# Patient Record
Sex: FEMALE | Race: WHITE | HISPANIC OR LATINO | Employment: UNEMPLOYED | URBAN - METROPOLITAN AREA
[De-identification: names, ages, dates, MRNs, and addresses within clinical notes are randomized per-mention and may not be internally consistent; named-entity substitution may affect disease eponyms.]

---

## 2018-01-12 ENCOUNTER — OFFICE VISIT (OUTPATIENT)
Dept: URGENT CARE | Facility: MEDICAL CENTER | Age: 48
End: 2018-01-12
Payer: COMMERCIAL

## 2018-01-12 PROCEDURE — S9088 SERVICES PROVIDED IN URGENT: HCPCS

## 2018-01-12 PROCEDURE — 99203 OFFICE O/P NEW LOW 30 MIN: CPT

## 2018-01-14 NOTE — PROGRESS NOTES
Assessment   1  Dermatitis (692 9) (L30 9)   2  Acute pain of left wrist (719 43) (M22 532)    Plan   Acute pain of left wrist    · Wrist splint; Status:Complete;   Done: [id number]   · *1 -  [facility] Co-Management  *  Status: Hold For - Scheduling     Requested for: 35NCT5779  Care Summary provided  : Yes  Dermatitis    · Medrol 4 MG Oral Tablet Therapy Pack (MethylPREDNISolone); Take as directed  Six pills today, 5 the next day, four the next day, three the next day,    two the next day, one the last day    Discussion/Summary   Discussion Summary:    Patient rash secondary to unknown etiology at this point  She will be treated with a course of steroids for the rash and for the left wrist pain, along with a left wrist splint  You can also try OTC antihistamine such as benadryl or claritin/allegra/zyrtec  Due to patient's family history of autoimmune diseases, she should follow up with the primary care physician and/or Rheumatology for further evaluation of Systemic Lupus, Rhuematoid Arthitis, etc    use the wrist splint as directed  Take medication as directed  if any distress at all, go to emergency room right away for immediate evaluation  Medication Side Effects Reviewed: Possible side effects of new medications were reviewed with the patient/guardian today  Understands and agrees with treatment plan: The treatment plan was reviewed with the patient/guardian  The patient/guardian understands and agrees with the treatment plan    Counseling Documentation With Imm: The patient was counseled regarding instructions for management  Follow Up Instructions: Follow Up with your Primary Care Provider in 3-5 days  If your symptoms worsen, go to the [place] [address] Emergency Department  Chief Complaint   1  Rash  Chief Complaint Free Text Note Form: Pt states 5 days ago started with rash on her arms (mostly R), legs, neck and upper chest  Denies new food or products  Mildly itchy  Took Benadryl with little relief  Also could not swallow, and Left ankle was swollen  Denies injury  The left ankle swelling went away the next day  2 days ago left knee pain and swollen  Yesterday afternoon Left wrist pain  This afternoon her left wrist became stiff and swollen and feels nauseous  History of Present Illness   HPI: Agree with nurse's note  Patient has previous history of carpal tunnel repair on the left wrist  She is unsure were the rash started, but states that is diffusely spread on her right arm, on her anterior/posterior chest, on her face, and on both legs bilaterally  Also small spots on the left arm   She denies any new medications, stopping any medications, new lotions, new detergents, in new clothing material  Patient states that she has multiple family members on her side, a sister and a niece both diagnosed with lupus  Her other sister has been diagnosed with hypothyroid  She has never been tested for any autoimmune disorders at this time  She states that this has never occurred before  She denies any fevers, chills, shortness of breath, difficulty breathing, chest pain, abdominal pain, nausea /vomiting  She states that she had a loose stool 2 days ago that she describes as diarrhea like, she denies any blood in the stool  The joint pain started along with rash, started in the left ankle, left knee then which have both resolved but now the Left wrist has felt very swollen and stiff since yesterday  She describes episodes of fatigued as well since the joint pain in the rash started  She denies any history of psoriasis or atopic dermatitis    Hospital Based Practices Required Assessment:      Abuse And Domestic Violence Screen   Domestic violence screen not done today  Reason DV Screen not done: family in room       Depression And Suicide Screen  Suicide screen not done today  -- Reason suicide screen not done: family in room  Prefered Language is  [place]        Primary Language is  [place]  Review of Systems   Focused-Female:      Constitutional: as noted in HPI       ENT: as noted in HPI  Cardiovascular: as noted in HPI  Respiratory: as noted in HPI  Gastrointestinal: as noted in HPI  ROS Reviewed:    ROS reviewed  Active Problems   1  Breast cancer screening (V76 10) (Z12 39)   2  Encounter for colorectal cancer screening (V76 51) (Z12 11,Z12 12)   3  Esophageal reflux (530 81) (K21 9)   4  Esophagitis (530 10) (K20 9)   5  Flu vaccine need (V04 81) (Z23)   6  Need for immunization against influenza (V04 81) (Z23)   7  Obesity (278 00) (E66 9)   8  Screening for malignant neoplasm of cervix (V76 2) (Z12 4)   9  Vitamin D deficiency (268 9) (E55 9)    Past Medical History   1  History of acute pharyngitis (V12 69) (Z87 09)   2  History of hypokalemia (V12 29) (Z86 39)   3  History of seborrheic keratosis (V13 3) (Z87 2)   4  History of upper respiratory infection (V12 09) (Z87 09)  Active Problems And Past Medical History Reviewed: The active problems and past medical history were reviewed and updated today  Family History   Mother    1  No pertinent family history  Sister    2  Family history of Colon cancer  Family History    3  Family history of Coronary Artery Disease (V17 49)   4  Family history of Diabetes Mellitus (V18 0)   5  Family history of Hypertension (V17 49)   6  Family history of Stroke Syndrome (V17 1)  Family History Reviewed: The family history was reviewed and updated today  Social History    · Denied: History of Alcohol Use (History)   · Never A Smoker   · Denied: History of Tobacco Use  Social History Reviewed: The social history was reviewed and updated today  The social history was reviewed and is unchanged  Surgical History   1  History of Esophagogastroduodenoscopy With Biopsy  Surgical History Reviewed: The surgical history was reviewed and updated today  Current Meds    1   Calcium 500/D 500-200 MG-UNIT Oral Tablet; Take 2 daily; Therapy: 68GGH0367 to (Last Rx:24Nov2015) Ordered   2  Multiple Vitamins Oral Tablet; Take 1 daily; Therapy: 34RYJ9379 to (Last Rx:24Nov2015) Ordered   3  Omeprazole 20 MG Oral Capsule Delayed Release; TAKE 1 CAPSULE DAILY; Therapy: 38KEK6558 to (Evaluate:07Spc8116); Last Rx:24Nov2015 Ordered  Medication List Reviewed: The medication list was reviewed and updated today  Allergies   1  No Known Drug Allergies    Vitals   Signs   Recorded: 12Jan2018 06:38PM   Temperature: 99 1 F  Heart Rate: 86  Respiration: 18  Systolic: 076  Diastolic: 82  Weight: 262 lb 2 oz  BMI Calculated: 46 69  BSA Calculated: 2 07  O2 Saturation: 98    Physical Exam        Constitutional      General appearance: No acute distress, well appearing and well nourished  Eyes      Conjunctiva and lids: No swelling, erythema or discharge  Pupils and irises: Equal, round and reactive to light  Ears, Nose, Mouth, and Throat      External inspection of ears and nose: Normal        Otoscopic examination: Tympanic membranes translucent with normal light reflex  Canals patent without erythema  Nasal mucosa, septum, and turbinates: Normal without edema or erythema  Oropharynx: Normal with no erythema, edema, exudate or lesions  Pulmonary      Respiratory effort: No increased work of breathing or signs of respiratory distress  Auscultation of lungs: Clear to auscultation  Cardiovascular      Auscultation of heart: Normal rate and rhythm, normal S1 and S2, without murmurs  Musculoskeletal      Gait and station: Normal        Inspection/palpation of joints, bones, and muscles: Abnormal  -- very tender to palpation of the left wrist diffusely  Active range of motion was not able to be evaluated due to intense pain experienced by the patient  no visible bruising /edema / erythema   No visible puncture wounds, bleeding from the left wrist       Skin Skin and subcutaneous tissue: Abnormal  -- Diffuse erythematous macular blanchable rash found on both upper extremities, chest, on the face, Nasolabial folds are not spared with this rash  Rash is not tender to touch  No visible vesicles, bullae, ulcerations, crusting  No active bleeding from any of the sites        Psychiatric      Orientation to person, place, and time: Normal        Signatures    Electronically signed by : JULIUS Sawant; Jan 12 2018  7:19PM EST                       (Author)     Electronically signed by : JOAQUIM Seaman ; Jan 13 2018 10:26AM EST

## 2018-01-18 ENCOUNTER — TRANSCRIBE ORDERS (OUTPATIENT)
Dept: ADMINISTRATIVE | Facility: HOSPITAL | Age: 48
End: 2018-01-18

## 2018-01-18 ENCOUNTER — ALLSCRIPTS OFFICE VISIT (OUTPATIENT)
Dept: OTHER | Facility: OTHER | Age: 48
End: 2018-01-18

## 2018-01-18 ENCOUNTER — APPOINTMENT (OUTPATIENT)
Dept: LAB | Facility: HOSPITAL | Age: 48
End: 2018-01-18
Payer: COMMERCIAL

## 2018-01-18 DIAGNOSIS — L30.9 DERMATITIS: ICD-10-CM

## 2018-01-18 DIAGNOSIS — M25.50 PAIN IN JOINT, MULTIPLE SITES: ICD-10-CM

## 2018-01-18 DIAGNOSIS — M25.532 PAIN IN LEFT WRIST: ICD-10-CM

## 2018-01-18 DIAGNOSIS — M25.50 PAIN IN JOINT, MULTIPLE SITES: Primary | ICD-10-CM

## 2018-01-18 DIAGNOSIS — M25.50 PAIN IN JOINT: ICD-10-CM

## 2018-01-18 LAB
ALBUMIN SERPL BCP-MCNC: 3.2 G/DL (ref 3.5–5)
ALP SERPL-CCNC: 70 U/L (ref 46–116)
ALT SERPL W P-5'-P-CCNC: 59 U/L (ref 12–78)
ANION GAP SERPL CALCULATED.3IONS-SCNC: 8 MMOL/L (ref 4–13)
AST SERPL W P-5'-P-CCNC: 21 U/L (ref 5–45)
BASOPHILS # BLD AUTO: 0.03 THOUSANDS/ΜL (ref 0–0.1)
BASOPHILS NFR BLD AUTO: 0 % (ref 0–1)
BILIRUB SERPL-MCNC: 0.16 MG/DL (ref 0.2–1)
BUN SERPL-MCNC: 18 MG/DL (ref 5–25)
CALCIUM SERPL-MCNC: 8.7 MG/DL (ref 8.3–10.1)
CHLORIDE SERPL-SCNC: 103 MMOL/L (ref 100–108)
CO2 SERPL-SCNC: 29 MMOL/L (ref 21–32)
CREAT SERPL-MCNC: 0.82 MG/DL (ref 0.6–1.3)
CRP SERPL QL: 5.8 MG/L
EOSINOPHIL # BLD AUTO: 0.15 THOUSAND/ΜL (ref 0–0.61)
EOSINOPHIL NFR BLD AUTO: 1 % (ref 0–6)
ERYTHROCYTE [DISTWIDTH] IN BLOOD BY AUTOMATED COUNT: 14.5 % (ref 11.6–15.1)
ERYTHROCYTE [SEDIMENTATION RATE] IN BLOOD: 18 MM/HOUR (ref 0–20)
GFR SERPL CREATININE-BSD FRML MDRD: 85 ML/MIN/1.73SQ M
GLUCOSE SERPL-MCNC: 96 MG/DL (ref 65–140)
HCT VFR BLD AUTO: 38.4 % (ref 34.8–46.1)
HGB BLD-MCNC: 12.3 G/DL (ref 11.5–15.4)
LYMPHOCYTES # BLD AUTO: 2.1 THOUSANDS/ΜL (ref 0.6–4.47)
LYMPHOCYTES NFR BLD AUTO: 16 % (ref 14–44)
MCH RBC QN AUTO: 28 PG (ref 26.8–34.3)
MCHC RBC AUTO-ENTMCNC: 32 G/DL (ref 31.4–37.4)
MCV RBC AUTO: 87 FL (ref 82–98)
MONOCYTES # BLD AUTO: 0.82 THOUSAND/ΜL (ref 0.17–1.22)
MONOCYTES NFR BLD AUTO: 6 % (ref 4–12)
NEUTROPHILS # BLD AUTO: 9.73 THOUSANDS/ΜL (ref 1.85–7.62)
NEUTS SEG NFR BLD AUTO: 77 % (ref 43–75)
NRBC BLD AUTO-RTO: 0 /100 WBCS
PLATELET # BLD AUTO: 310 THOUSANDS/UL (ref 149–390)
PMV BLD AUTO: 10 FL (ref 8.9–12.7)
POTASSIUM SERPL-SCNC: 4 MMOL/L (ref 3.5–5.3)
PROT SERPL-MCNC: 7.2 G/DL (ref 6.4–8.2)
RBC # BLD AUTO: 4.4 MILLION/UL (ref 3.81–5.12)
SODIUM SERPL-SCNC: 140 MMOL/L (ref 136–145)
TSH SERPL DL<=0.05 MIU/L-ACNC: 1.75 UIU/ML (ref 0.36–3.74)
WBC # BLD AUTO: 12.83 THOUSAND/UL (ref 4.31–10.16)

## 2018-01-18 PROCEDURE — 86430 RHEUMATOID FACTOR TEST QUAL: CPT

## 2018-01-18 PROCEDURE — 86618 LYME DISEASE ANTIBODY: CPT

## 2018-01-18 PROCEDURE — 86038 ANTINUCLEAR ANTIBODIES: CPT

## 2018-01-18 PROCEDURE — 85652 RBC SED RATE AUTOMATED: CPT

## 2018-01-18 PROCEDURE — 86140 C-REACTIVE PROTEIN: CPT

## 2018-01-18 PROCEDURE — 36415 COLL VENOUS BLD VENIPUNCTURE: CPT

## 2018-01-18 PROCEDURE — 84443 ASSAY THYROID STIM HORMONE: CPT

## 2018-01-18 PROCEDURE — 80053 COMPREHEN METABOLIC PANEL: CPT

## 2018-01-18 PROCEDURE — 85025 COMPLETE CBC W/AUTO DIFF WBC: CPT

## 2018-01-19 LAB
B BURGDOR IGG SER IA-ACNC: 0.14
B BURGDOR IGM SER IA-ACNC: 0.37
RHEUMATOID FACT SER QL LA: NEGATIVE
RYE IGE QN: NEGATIVE

## 2018-01-19 NOTE — PROGRESS NOTES
Assessment   1  Acute pain of left wrist (719 43) (M25 532)   2  Arthralgia (719 40) (M25 50)   3  Dermatitis (692 9) (L30 9)    Plan   Acute pain of left wrist    · *1 -  [facility]YSICAL THERAPY-[facility] Co-Management  *  Status: Active     Requested for: 91CIK7715  Care Summary provided  : Yes   · 1 - Vladimir SPENCER, Roberth Quiles  (Orthopedic Surgery) Co-Management  *  Status: Active     Requested for: 88SAM8752  Care Summary provided  : Yes  Arthralgia    · (1) SHAINA SCREEN W/REFLEX TO TITER/PATTERN; Status:Active; Requested    CDF:[id number];    · (1) CBC/PLT/DIFF; Status:Active; Requested AXJ:[id number];    · (1) COMPREHENSIVE METABOLIC PANEL; Status:Active; Requested NYN:[id number];    · (1) C-REACTIVE PROTEIN; Status:Active; Requested [id number];    · (1) RHEUMATOID FACTOR SCREEN; Status:Active; Requested SCU:[id number];    · (1) SED RATE; Status:Active; Requested for:2018;    · (1) TSH WITH FT4 REFLEX; Status:Active; Requested [id number]; Arthralgia, Dermatitis    · (1) LYME ANTIBODY PROFILE W/REFLEX TO WESTERN BLOT; Status:Active; Requested    K:[id number]; Discussion/Summary      Patient will be referred to a hand specialsit for her left wrist pain I will also refer her for physical therapy Patient to have albs done again for rheumatlogy disorder Discussed that there is a also viral illness as a possibility for her  Chief Complaint   follow up urgent care   rash and joint pain      History of Present Illness   Patient is here for marylin from urgent care She has started with pain in the left ankle with swelling about 2 weeks ago that went away on its own in 2-3 days and then bilateral knees started to have pain and swelling also Then her left shoulder and now the left wrist pain Patiet has rash diffusely on the arms and legs Patient was seen at [facility] and advised to have rheumatology workup Patient had labs done for autoimmune testing in  and was negative per our records She was placed on steroids and sent home Patient has history of left wrist dequervain tenosynovitis surgery       Brayden Alcocer presents with complaints of sudden onset of constant episodes of moderate neck, bilateral shoulder, left wrist, bilateral knee and left ankle arthralgias, described as aching, non-radiating  Episodes started about 2 weeks ago  Her symptoms are caused by no known event  Symptoms are improved by NSAIDs and rest  Symptoms are unchanged  Risk Factors: obesity and sedentary life style  Pertinent Medical History: no connective tissue disease, no rheumatoid arthritis, no osteoarthritis, no gout, no impaired immunity, no systemic lupus erythematosus, no sickle cell disease, no hemophilia, no diabetes, no inflammatory bowel disease, no chronic fatigue syndrome, no fibromyalgia, no hepatitis, no lyme disease, no malignancy, no HIV infection and no STD  Family History: connective tissue disease, but not osteoarthritis, rheumatoid arthritis, gout, inflammatory bowel disease, sickle cell disease, hemophilia and tuberculosis  Associated symptoms include swelling,-- stiffness-- and-- rash, but-- no redness,-- no joint warmth,-- no deformity,-- no decreased range of motion,-- no joint instability,-- no fever,-- no chills,-- no weight loss,-- no fatigue,-- no weakness,-- no eye irritation,-- no eye pain,-- no photosensitivity,-- no nausea,-- no vomiting,-- no anorexia,-- no abdominal discomfort,-- no constipation,-- no diarrhea,-- no shortness of breath,-- no chest pain-- and-- no cough  Review of Systems        Constitutional: as noted in HPI  Eyes: No complaints of eye pain, no red eyes, no eyesight problems, no discharge, no dry eyes, no itching of eyes  ENT: no complaints of earache, no loss of hearing, no nose bleeds, no nasal discharge, no sore throat, no hoarseness  Cardiovascular: no chest pain-- and-- no palpitations        Respiratory: No complaints of shortness of breath, no wheezing, no cough, no SOB on exertion, no orthopnea, no PND  Musculoskeletal: as noted in HPI  Integumentary: as noted in HPI  Active Problems   1  Acute pain of left wrist (719 43) (M25 532)   2  Breast cancer screening (V76 10) (Z12 39)   3  Dermatitis (692 9) (L30 9)   4  Encounter for colorectal cancer screening (V76 51) (Z12 11,Z12 12)   5  Esophageal reflux (530 81) (K21 9)   6  Flu vaccine need (V04 81) (Z23)   7  Need for immunization against influenza (V04 81) (Z23)   8  Obesity (278 00) (E66 9)   9  Screening for malignant neoplasm of cervix (V76 2) (Z12 4)   10  Vitamin D deficiency (268 9) (E55 9)    Past Medical History   1  History of acute pharyngitis (V12 69) (Z87 09)   2  History of esophagitis (V12 79) (Z87 19)   3  History of hypokalemia (V12 29) (Z86 39)   4  History of seborrheic keratosis (V13 3) (Z87 2)   5  History of upper respiratory infection (V12 09) (Z87 09)     The active problems and past medical history were reviewed and updated today  Surgical History   1  History of Esophagogastroduodenoscopy With Biopsy    Family History   Mother    1  No pertinent family history  Sister    2  Family history of Colon cancer  Family History    3  Family history of Coronary Artery Disease (V17 49)   4  Family history of Diabetes Mellitus (V18 0)   5  Family history of Hypertension (V17 49)   6  Family history of Stroke Syndrome (V17 1)     The family history was reviewed and updated today  Social History    · Denied: History of Alcohol Use (History)   · Never A Smoker   · Denied: History of Tobacco Use  The social history was reviewed and is unchanged  Current Meds    1  Calcium 500/D 500-200 MG-UNIT Oral Tablet; Take 2 daily; Therapy: 44RUG7607 to (Last Rx:24Nov2015) Ordered   2  Medrol 4 MG Oral Tablet Therapy Pack; Take as directed  Six pills today, 5 the next day,     four the next day, three the next day, two the next day, one the last day;      Therapy: 61UKK9862 to (Last Rx:12Jan2018)  Requested for: 12Jan2018 Ordered   3  Multiple Vitamins Oral Tablet; Take 1 daily; Therapy: 97QIH5290 to (Last Rx:24Nov2015) Ordered   4  Omeprazole 20 MG Oral Capsule Delayed Release; TAKE 1 CAPSULE DAILY; Therapy: 03FRF7094 to (Evaluate:05Yrw5578); Last Rx:24Nov2015 Ordered     The medication list was reviewed and updated today  Allergies   1  No Known Drug Allergies    Vitals   Vital Signs    Recorded: 76OTE2020 14:71DM   Systolic 065   Diastolic 82   Height 5 ft 1 in   Weight 251 lb 8 oz   BMI Calculated 47 52   BSA Calculated 2 08     Physical Exam        Constitutional      General appearance: No acute distress, well appearing and well nourished  Eyes      Conjunctiva and lids: No swelling, erythema or discharge  Pupils and irises: Equal, round and reactive to light  Ears, Nose, Mouth, and Throat      External inspection of ears and nose: Normal        Otoscopic examination: Tympanic membranes translucent with normal light reflex  Canals patent without erythema  Nasal mucosa, septum, and turbinates: Normal without edema or erythema  Oropharynx: Normal with no erythema, edema, exudate or lesions  Pulmonary      Respiratory effort: No increased work of breathing or signs of respiratory distress  Auscultation of lungs: Clear to auscultation  Cardiovascular      Auscultation of heart: Normal rate and rhythm, normal S1 and S2, without murmurs  Examination of extremities for edema and/or varicosities: Normal        Carotid pulses: Normal        Lymphatic      Palpation of lymph nodes in neck: No lymphadenopathy  Musculoskeletal      Gait and station: Normal        Digits and nails: Normal without clubbing or cyanosis  Inspection/palpation of joints, bones, and muscles: Abnormal  -- left wrist pain to palpation mediall with swellling and tenderness at the 1st MCP joint        Skin      Skin and subcutaneous tissue: Abnormal  -- macular rash on legs, chest and amrs that blanches Face is spared as are palms and soles  Neurologic      Cranial nerves: Cranial nerves 2-12 intact         Psychiatric      Orientation to person, place, and time: Normal        Mood and affect: Normal           Signatures    Electronically signed by : Angelica Purcell DO; Jan 18 2018 12:31PM EST                       (Author)

## 2018-01-21 ENCOUNTER — GENERIC CONVERSION - ENCOUNTER (OUTPATIENT)
Dept: OTHER | Facility: OTHER | Age: 48
End: 2018-01-21

## 2018-01-22 VITALS
WEIGHT: 251.5 LBS | DIASTOLIC BLOOD PRESSURE: 82 MMHG | HEIGHT: 61 IN | SYSTOLIC BLOOD PRESSURE: 120 MMHG | BODY MASS INDEX: 47.48 KG/M2

## 2018-01-23 VITALS
DIASTOLIC BLOOD PRESSURE: 82 MMHG | WEIGHT: 247.13 LBS | HEART RATE: 86 BPM | RESPIRATION RATE: 18 BRPM | BODY MASS INDEX: 46.69 KG/M2 | OXYGEN SATURATION: 98 % | SYSTOLIC BLOOD PRESSURE: 128 MMHG | TEMPERATURE: 99.1 F

## 2018-01-24 NOTE — RESULT NOTES
Verified Results  (1) SHAINA SCREEN W/REFLEX TO TITER/PATTERN [id number] 12:58PM Fabiana Smart     Test Name Result Flag Reference   SHAINA SCREEN   Negative  Negative

## 2019-08-05 ENCOUNTER — LAB REQUISITION (OUTPATIENT)
Dept: LAB | Facility: HOSPITAL | Age: 49
End: 2019-08-05
Payer: COMMERCIAL

## 2019-08-05 DIAGNOSIS — N92.0 EXCESSIVE AND FREQUENT MENSTRUATION WITH REGULAR CYCLE: ICD-10-CM

## 2019-08-05 PROCEDURE — 88305 TISSUE EXAM BY PATHOLOGIST: CPT | Performed by: PATHOLOGY

## 2019-08-08 ENCOUNTER — TRANSCRIBE ORDERS (OUTPATIENT)
Dept: ADMINISTRATIVE | Facility: HOSPITAL | Age: 49
End: 2019-08-08

## 2019-08-08 DIAGNOSIS — Z12.39 BREAST SCREENING, UNSPECIFIED: Primary | ICD-10-CM

## 2019-08-13 ENCOUNTER — HOSPITAL ENCOUNTER (OUTPATIENT)
Dept: RADIOLOGY | Age: 49
Discharge: HOME | End: 2019-08-13
Payer: COMMERCIAL

## 2019-08-13 VITALS — HEIGHT: 61 IN | BODY MASS INDEX: 48.9 KG/M2 | WEIGHT: 259 LBS

## 2019-08-13 DIAGNOSIS — Z12.39 BREAST SCREENING, UNSPECIFIED: ICD-10-CM

## 2019-08-13 PROCEDURE — 77067 SCR MAMMO BI INCL CAD: CPT

## 2019-08-13 PROCEDURE — 77063 BREAST TOMOSYNTHESIS BI: CPT

## 2019-08-27 ENCOUNTER — HOSPITAL ENCOUNTER (OUTPATIENT)
Dept: ULTRASOUND IMAGING | Facility: CLINIC | Age: 49
Discharge: HOME | End: 2019-08-27
Payer: COMMERCIAL

## 2019-08-27 ENCOUNTER — HOSPITAL ENCOUNTER (OUTPATIENT)
Dept: MAMMOGRAPHY | Facility: CLINIC | Age: 49
Discharge: HOME | End: 2019-08-27
Payer: COMMERCIAL

## 2019-08-27 DIAGNOSIS — R92.8 ABNORMAL MAMMOGRAM: ICD-10-CM

## 2019-08-27 PROCEDURE — 76642 ULTRASOUND BREAST LIMITED: CPT

## 2019-08-27 PROCEDURE — G0279 TOMOSYNTHESIS, MAMMO: HCPCS

## 2019-08-27 PROCEDURE — 77065 DX MAMMO INCL CAD UNI: CPT

## 2019-08-27 NOTE — PROGRESS NOTES
Met with patient and Dr Ly Rodas   regarding recommendation for;      _____ RIGHT __x____LEFT      __x___Ultrasound guided  ______Stereotactic  Breast biopsy  ___x__Verbalized understanding  Blood thinners:  _____yes _x____no    Date stopped: ___x________    Biopsy teaching sheet given:  __x_____yes ______no    Alexda Pastel states she has history of uterine fibroids and pCOS

## 2019-08-29 ENCOUNTER — HOSPITAL ENCOUNTER (OUTPATIENT)
Dept: MAMMOGRAPHY | Facility: CLINIC | Age: 49
Discharge: HOME | End: 2019-08-29
Payer: COMMERCIAL

## 2019-08-29 ENCOUNTER — HOSPITAL ENCOUNTER (OUTPATIENT)
Dept: ULTRASOUND IMAGING | Facility: CLINIC | Age: 49
Discharge: HOME | End: 2019-08-29
Payer: COMMERCIAL

## 2019-08-29 VITALS
BODY MASS INDEX: 48.9 KG/M2 | DIASTOLIC BLOOD PRESSURE: 80 MMHG | WEIGHT: 259 LBS | SYSTOLIC BLOOD PRESSURE: 140 MMHG | HEART RATE: 76 BPM | HEIGHT: 61 IN

## 2019-08-29 DIAGNOSIS — R92.8 ABNORMAL ULTRASOUND OF BREAST: ICD-10-CM

## 2019-08-29 DIAGNOSIS — Z98.890 STATUS POST BREAST BIOPSY: ICD-10-CM

## 2019-08-29 PROCEDURE — 10160 PNXR ASPIR ABSC HMTMA BULLA: CPT

## 2019-08-29 PROCEDURE — 76942 ECHO GUIDE FOR BIOPSY: CPT

## 2019-08-29 RX ORDER — LIDOCAINE HYDROCHLORIDE 10 MG/ML
4 INJECTION, SOLUTION INFILTRATION; PERINEURAL ONCE
Status: COMPLETED | OUTPATIENT
Start: 2019-08-29 | End: 2019-08-29

## 2019-08-29 RX ADMIN — LIDOCAINE HYDROCHLORIDE 4 ML: 10 INJECTION, SOLUTION INFILTRATION; PERINEURAL at 11:40

## 2019-08-29 NOTE — PROGRESS NOTES
Procedure type:    __x___ultrasound guided cyst aspiration _____stereotactic    Breast:    __x___Left _____Right    Location: 3:00 7cm from nipple    Needle: 18g BD    # of passes: 1-scant white/yellow turbid fluid-discarded per Dr Reece Resides: Ultraclip-wing    Performed by: Dr Isis Alexander held for 5 minutes by: Viola Koch Strips:    __x___yes _____no    Band aid:    __x___yes_____no    Tape and guaze:    _____yes __x___no    Tolerated procedure:    __x___yes _____no

## 2019-08-29 NOTE — DISCHARGE INSTR - OTHER ORDERS
POST LARGE CORE BREAST BIOPSY PATIENT INFORMATION      1  Place an ice pack inside your bra over the top of the dressing every hour for 20 minutes (20 minutes on, 60 minutes off)  Do this until bedtime  2  Do not shower or bathe until the following morning  3  You may bathe your breast carefully with the steri-strips in place  Be careful    Not to loosen them  The steri-strips will fall off in 3-5 days  4  You may have mild discomfort, and you may have some bruising where the   Needle entered the skin  This should clear within 5-7 days  5  If you need medicine for discomfort, take acetaminophen products such as   Tylenol  You may also take Advil or Motrin products  6  Do not participate in strenuous activities such as-tennis, aerobics, skiing,  Weight lifting, etc  for 24 hours  Refrain from swimming/soaking for 72 hours  7  Wearing a bra for sleeping may be more comfortable for the first 24-48 hours  8  Watch for continued bleeding, pain or fever over 101; please call with any questions or concerns  [facility] call:  Bree Crook RN at [phone]  Suzan Lin RN at [phone]                    *After 4 PM call the Interventional Radiology Department                    [phone] and ask to speak with the nurse on call  For procedures done at the [address] [place] [address] call:         Di Reynolds RN at   *After 4 PM call the Interventional Radiology Department   [phone] and ask to speak with the nurse on call  For procedures done at [address] [place] [address] call: The Radiology Nurse at [phone]  *After 4 PM call your physician, or go to the Emergency Department  9          The final results of your biopsy are usually available within one week

## 2019-08-30 NOTE — PROGRESS NOTES
Post procedure call completed on 8/30/19 @ 1136    Bleeding: _____yes __x___no    Pain: _____yes __x____no    Redness/Swelling: ______yes ___x___no    Band aid removed: ___x__yes _____no    Steri-Strips intact: __x____yes _____no

## 2020-01-15 ENCOUNTER — OFFICE VISIT (OUTPATIENT)
Dept: OBGYN CLINIC | Facility: MEDICAL CENTER | Age: 50
End: 2020-01-15

## 2020-01-15 VITALS — WEIGHT: 259 LBS | BODY MASS INDEX: 48.9 KG/M2 | HEIGHT: 61 IN

## 2020-01-15 DIAGNOSIS — G56.32 RADIAL NEURITIS, LEFT: Primary | ICD-10-CM

## 2020-01-15 DIAGNOSIS — M18.11 ARTHRITIS OF CARPOMETACARPAL (CMC) JOINT OF RIGHT THUMB: ICD-10-CM

## 2020-01-15 PROCEDURE — 99204 OFFICE O/P NEW MOD 45 MIN: CPT | Performed by: ORTHOPAEDIC SURGERY

## 2020-01-15 RX ORDER — GABAPENTIN 100 MG/1
100 CAPSULE ORAL
Qty: 30 CAPSULE | Refills: 0 | Status: SHIPPED | OUTPATIENT
Start: 2020-01-15 | End: 2020-10-02

## 2020-01-15 NOTE — PROGRESS NOTES
Chief Complaint     Left wrist pain      History of Present Illness     Majo Chapman is a 47 y o  female left much greater than right wrist pain   She is right-hand dominant  She notes more than 20 years of left wrist pain without any obvious injury or trauma  This is localized initially over the radial aspect but has now extended throughout the whole wrist   She underwent release de Quervain tendonitis about 10 years ago without prior injection  This did not relieve her symptoms  She had a 2nd surgery which was exploratory, revealing a torn ligament that was repaired  After surgery from this could tendon repair, she was only placed into a splint for a few weeks and was taken out once her stitches were removed  That also did not relieve her pain  She has had what seems to be multiple wrist joint injections as well as physical therapy  She has had a few episodes of severe pain and inability to move her wrist   She describes constant burning and hypersensitivity  She cannot have her shirt sleeve over this area  She also mentions her thumb feeling stiff with occasional clicking and popping  She has also had EMG studies in the past which were within normal limits  Of note, she has had distal radial forearm pain that has radiated into the dorsal radial aspect of her hand throughout this entire process  The surgeries that she has had have not made her better from her initial symptomatology, but she has also had worsening symptoms into the dorsal thumb and hand  She is exquisitely hypersensitive to touch whenever she touches objects on the dorsum of her hand      History reviewed  No pertinent past medical history  Past Surgical History:   Procedure Laterality Date    DE QUERVAIN'S RELEASE      GALLBLADDER SURGERY      US BREAST CYST ASPIRATION LEFT INITIAL Left 8/29/2019       No Known Allergies    No current outpatient medications on file prior to visit       No current facility-administered medications on file prior to visit  Social History     Tobacco Use    Smoking status: Never Smoker    Smokeless tobacco: Never Used   Substance Use Topics    Alcohol use: Not Currently    Drug use: Never       Family History   Problem Relation Age of Onset    No Known Problems Mother     No Known Problems Father     No Known Problems Sister     No Known Problems Daughter     No Known Problems Maternal Grandmother     No Known Problems Maternal Grandfather     No Known Problems Paternal Grandmother     No Known Problems Paternal Grandfather     No Known Problems Sister     Breast cancer Half-Sister 52    Colon cancer Half-Sister 62    Breast cancer additional onset Half-Sister         total 4xs breast cancer    Endometrial cancer Half-Sister 47    Kidney failure Half-Sister     No Known Problems Maternal Aunt     No Known Problems Maternal Aunt     No Known Problems Paternal Aunt     No Known Problems Paternal Aunt        Review of Systems     As stated in the HPI  All other systems were reviewed and are negative  Physical Exam     Ht 5' 1" (1 549 m)   Wt 117 kg (259 lb)   BMI 48 94 kg/m²     GENERAL: This is a well-developed, well-nourished, age-appropriate patient in no acute distress  The patient is alert and oriented x3  Pleasant and cooperative  Eyes: Anicteric sclerae  Extraocular movements appear intact  HENT: Nares are patent with no drainage  Lungs: There is equal chest rise on inspection  Breathing is non-labored with no audible wheezing  Cardiovascular: No cyanosis  No upper extremity lymphadema  Skin: Skin is warm to touch  No obvious skin lesions or rashes other than described below  Neurologic: No ataxia  Psychiatric: Mood and affect are appropriate  On today's exam of the left wrist complaint was no swelling, ecchymosis, redness or signs infection  The skin is warm, dry and well perfused  She was mildly tender to palpation over the 1st dorsal compartment  Significant tenderness to palpation at the thumb CMC joint  Mildly positive Eichhoff and Finkelstein tests  There is no palpable nodule at the A1 pulley or edwin triggering on today's exam   She is able make a full composite fist oppose the thumb to the small finger  She had equal the pain-free wrist range of motion in all planes bilaterally  Sensation was intact to light touch with good capillary refill in all fingers the slightly decreased in the radial sensory nerve  Positive Tinel's at the radial sensory nerve at the Wartenberg interval   With elbow extension wrist ulnar deviation and forearm pronation, she has any worsening of her neuropathic type symptoms  Distal radial pulses intact  There is no antecubital adenopathy or cellulitis noted  Data Review     Results Reviewed     None             Imaging:  None today    Assessment and Plan      Diagnoses and all orders for this visit:    Radial neuritis, left  -     diclofenac sodium (VOLTAREN) 1 %; Apply 2 g topically 4 (four) times a day  -     gabapentin (NEURONTIN) 100 mg capsule; Take 1 capsule (100 mg total) by mouth daily at bedtime    Arthritis of carpometacarpal (CMC) joint of right thumb  -     Thumb Cude comf/Cool           58-year-old female with what appears to be a radial sensory neuritis as well as thumb CMC arthritis  I discussed the radial sensory neuritis may have been pre-existing to her surgeries, but also that during de Quervain release, there are branches that are commonly encountered and protected, though these could have been injured potentially  This would make sense considering her symptomatology did worsen after her 1st surgery  I did discuss with her that conservative management is the mainstay for these types of injuries and that nerve gliding exercises as well as gabapentin would be the nonsurgical approaches that I would prefer at this point    We did discuss that external neurolysis and neuroma cutting and burying as needed if encountered would be the surgical approach  We would likely get an ultrasound prior to any surgical intervention to assess if there is neuroma on the nerve  She is in good understanding of this and we will proceed with nonsurgical management of this condition at this point  I have discussed the natural history of thumb carpometacarpal arthritis as well as the treatment options  We discussed that arthritis in the thumb can be treated similarly to how arthritis is treated in many parts of the body with conservative management and then surgical intervention if the nonsurgical options do not succeeded bringing sustained pain relief and good function  The nonsurgical options include oral anti-inflammatory medication, braces, hand therapy, and injectable medications such as corticosteroid  These can all be trialed and repeated multiple times  We have also discussed the surgical management of thumb carpometacarpal arthritis which include thumb carpometacarpal joint fusion or arthroplasty using LRTI or suspensionplasty techniques  We have provider with a Comfort Cool brace as well for her thumb CMC arthritis as well as Voltaren gel    She will follow up in 1 month      Follow Up:  1 month    To Do Next Visit:     PROCEDURES PERFORMED:  Procedures  No Procedures performed today

## 2020-08-14 ENCOUNTER — HOSPITAL ENCOUNTER (OUTPATIENT)
Dept: RADIOLOGY | Age: 50
Discharge: HOME | End: 2020-08-14
Payer: COMMERCIAL

## 2020-08-14 VITALS — WEIGHT: 270 LBS | HEIGHT: 60 IN | BODY MASS INDEX: 53.01 KG/M2

## 2020-08-14 DIAGNOSIS — Z12.31 ENCOUNTER FOR SCREENING MAMMOGRAM FOR MALIGNANT NEOPLASM OF BREAST: ICD-10-CM

## 2020-08-14 PROCEDURE — 77063 BREAST TOMOSYNTHESIS BI: CPT

## 2020-08-14 PROCEDURE — 77067 SCR MAMMO BI INCL CAD: CPT

## 2020-10-02 ENCOUNTER — OFFICE VISIT (OUTPATIENT)
Dept: FAMILY MEDICINE CLINIC | Facility: CLINIC | Age: 50
End: 2020-10-02
Payer: COMMERCIAL

## 2020-10-02 VITALS
WEIGHT: 262 LBS | SYSTOLIC BLOOD PRESSURE: 130 MMHG | BODY MASS INDEX: 51.44 KG/M2 | DIASTOLIC BLOOD PRESSURE: 90 MMHG | TEMPERATURE: 97.2 F | HEIGHT: 60 IN

## 2020-10-02 DIAGNOSIS — Z00.00 ANNUAL PHYSICAL EXAM: Primary | ICD-10-CM

## 2020-10-02 DIAGNOSIS — Z23 ENCOUNTER FOR IMMUNIZATION: ICD-10-CM

## 2020-10-02 DIAGNOSIS — Z83.49 FAMILY HISTORY OF HYPOTHYROIDISM: ICD-10-CM

## 2020-10-02 DIAGNOSIS — Z12.12 SCREENING FOR COLORECTAL CANCER: ICD-10-CM

## 2020-10-02 DIAGNOSIS — E55.9 VITAMIN D DEFICIENCY: ICD-10-CM

## 2020-10-02 DIAGNOSIS — Z12.11 SCREENING FOR COLORECTAL CANCER: ICD-10-CM

## 2020-10-02 DIAGNOSIS — E66.01 MORBID OBESITY WITH BODY MASS INDEX (BMI) GREATER THAN OR EQUAL TO 50 (HCC): ICD-10-CM

## 2020-10-02 DIAGNOSIS — Z12.4 SCREENING FOR CERVICAL CANCER: ICD-10-CM

## 2020-10-02 PROCEDURE — 90471 IMMUNIZATION ADMIN: CPT | Performed by: FAMILY MEDICINE

## 2020-10-02 PROCEDURE — 90715 TDAP VACCINE 7 YRS/> IM: CPT | Performed by: FAMILY MEDICINE

## 2020-10-02 PROCEDURE — 99396 PREV VISIT EST AGE 40-64: CPT | Performed by: FAMILY MEDICINE

## 2020-10-02 PROCEDURE — 1036F TOBACCO NON-USER: CPT | Performed by: FAMILY MEDICINE

## 2020-10-02 PROCEDURE — 3725F SCREEN DEPRESSION PERFORMED: CPT | Performed by: FAMILY MEDICINE

## 2021-08-19 ENCOUNTER — HOSPITAL ENCOUNTER (OUTPATIENT)
Dept: RADIOLOGY | Age: 51
Discharge: HOME | End: 2021-08-19
Payer: COMMERCIAL

## 2021-08-19 VITALS — BODY MASS INDEX: 53.01 KG/M2 | HEIGHT: 60 IN | WEIGHT: 270 LBS

## 2021-08-19 DIAGNOSIS — Z12.31 ENCOUNTER FOR SCREENING MAMMOGRAM FOR MALIGNANT NEOPLASM OF BREAST: ICD-10-CM

## 2021-08-19 PROCEDURE — 77067 SCR MAMMO BI INCL CAD: CPT

## 2021-08-19 PROCEDURE — 77063 BREAST TOMOSYNTHESIS BI: CPT

## 2021-08-25 ENCOUNTER — HOSPITAL ENCOUNTER (OUTPATIENT)
Dept: RADIOLOGY | Age: 51
Discharge: HOME | End: 2021-08-25

## 2021-08-25 VITALS — WEIGHT: 270 LBS | HEIGHT: 60 IN | BODY MASS INDEX: 53.01 KG/M2

## 2021-08-25 DIAGNOSIS — Z12.31 ENCOUNTER FOR SCREENING MAMMOGRAM FOR MALIGNANT NEOPLASM OF BREAST: ICD-10-CM

## 2021-08-25 DIAGNOSIS — Z12.31 SCREENING MAMMOGRAM, ENCOUNTER FOR: ICD-10-CM

## 2022-04-11 ENCOUNTER — OFFICE VISIT (OUTPATIENT)
Dept: FAMILY MEDICINE CLINIC | Facility: CLINIC | Age: 52
End: 2022-04-11

## 2022-04-11 VITALS
WEIGHT: 266.2 LBS | SYSTOLIC BLOOD PRESSURE: 128 MMHG | BODY MASS INDEX: 52.26 KG/M2 | DIASTOLIC BLOOD PRESSURE: 84 MMHG | HEIGHT: 60 IN

## 2022-04-11 DIAGNOSIS — E66.01 MORBID OBESITY WITH BODY MASS INDEX (BMI) GREATER THAN OR EQUAL TO 50 (HCC): ICD-10-CM

## 2022-04-11 DIAGNOSIS — Z11.1 SCREENING FOR TUBERCULOSIS: ICD-10-CM

## 2022-04-11 DIAGNOSIS — Z12.4 SCREENING FOR CERVICAL CANCER: ICD-10-CM

## 2022-04-11 DIAGNOSIS — Z11.4 SCREENING FOR HIV (HUMAN IMMUNODEFICIENCY VIRUS): ICD-10-CM

## 2022-04-11 DIAGNOSIS — Z59.89 UNINSURED: ICD-10-CM

## 2022-04-11 DIAGNOSIS — Z00.00 ANNUAL PHYSICAL EXAM: Primary | ICD-10-CM

## 2022-04-11 DIAGNOSIS — Z13.220 SCREENING, LIPID: ICD-10-CM

## 2022-04-11 DIAGNOSIS — Z11.59 NEED FOR HEPATITIS C SCREENING TEST: ICD-10-CM

## 2022-04-11 DIAGNOSIS — N92.4 EXCESSIVE BLEEDING IN PREMENOPAUSAL PERIOD: ICD-10-CM

## 2022-04-11 DIAGNOSIS — Z13.1 SCREENING FOR DIABETES MELLITUS: ICD-10-CM

## 2022-04-11 PROBLEM — Z59.71 UNINSURED: Status: ACTIVE | Noted: 2022-04-11

## 2022-04-11 PROCEDURE — 99396 PREV VISIT EST AGE 40-64: CPT | Performed by: FAMILY MEDICINE

## 2022-04-11 SDOH — ECONOMIC STABILITY - INCOME SECURITY: OTHER PROBLEMS RELATED TO HOUSING AND ECONOMIC CIRCUMSTANCES: Z59.89

## 2022-04-11 NOTE — PROGRESS NOTES
[address] [address] [facility]    NAME: Isaac Butcher  AGE: 45 y o   SEX: female  : 1970     DATE: 2022     Assessment and Plan:     Problem List Items Addressed This Visit        Other    Morbid obesity with body mass index (BMI) greater than or equal to 50 (HCC)     Discussed diet and exercise with patient We also discussed screening for diabetes and lipids I also discussed covid vaccination with patient          Relevant Orders    Lipid panel    Comprehensive metabolic panel    TSH, 3rd generation    Ambulatory Referral to Social Work Care Management Program    Annual physical exam - Primary     Recommended diet and exercise as well as yearly flu shot covid vaccine  Patient also needs to see Gyn due to her excessive bleeding and alos needs colon cancer screening She is refusing these things due to lack of insurance         Excessive bleeding in premenopausal period     Patient has history of PCOS Patient is also having spotting for months She has had not seen GYN in some time no insurance          Relevant Orders    Ambulatory Referral to Obstetrics / Gynecology    Ambulatory Referral to Social Work Care Management Program    Uninsured     Contacted  and also suggested [facility] for her to get the GYN care she needs and also colon cancer screening          Relevant Orders    Ambulatory Referral to Social Work Care Management Program      Other Visit Diagnoses     Screening for diabetes mellitus        Relevant Orders    Comprehensive metabolic panel    Screening, lipid        Relevant Orders    Lipid panel    Screening for cervical cancer        Relevant Orders    Ambulatory Referral to Obstetrics / Gynecology    Screening for HIV (human immunodeficiency virus)        Relevant Orders    HIV 1/2 Antigen/Antibody (4th Generation) w Reflex [facility]    Need for hepatitis C screening test        Relevant Orders Hepatitis C antibody    Screening for tuberculosis        Relevant Orders    TB Skin Test          Immunizations and preventive care screenings were discussed with patient today  Appropriate education was printed on patient's after visit summary  Counseling:  Alcohol/drug use: discussed moderation in alcohol intake, the recommendations for healthy alcohol use, and avoidance of illicit drug use  Dental Health: discussed importance of regular tooth brushing, flossing, and dental visits  Injury prevention: discussed safety/seat belts, safety helmets, smoke detectors, carbon dioxide detectors, and smoking near bedding or upholstery  Sexual health: discussed sexually transmitted diseases, partner selection, use of condoms, avoidance of unintended pregnancy, and contraceptive alternatives  · Exercise: the importance of regular exercise/physical activity was discussed  Recommend exercise 3-5 times per week for at least 30 minutes  No follow-ups on file  Chief Complaint:     Chief Complaint   Patient presents with    Annual Exam      History of Present Illness:     Adult Annual Physical   Patient here for a comprehensive physical exam  The patient reports taht she has isseus with uterine spotting and bleeding between cycles    Diet and Physical Activity  · Diet/Nutrition: limited junk food and low carb diet  · Exercise: no formal exercise  Depression Screening  PHQ-2/9 Depression Screening    Little interest or pleasure in doing things: 0 - not at all  Feeling down, depressed, or hopeless: 0 - not at all  PHQ-2 Score: 0  PHQ-2 Interpretation: Negative depression screen       General Health  · Sleep: sleeps well and gets 4-6 hours of sleep on average  · Hearing: normal - bilateral   · Vision: most recent eye exam >1 year ago and wears glasses  · Dental: no dental visits for >1 year and brushes teeth twice daily         /GYN Health  · Patient is: perimenopausal  · Last menstrual period: has called them  · Contraceptive method: none  Review of Systems:     Review of Systems   Constitutional: Negative for fatigue, fever and unexpected weight change  HENT: Negative for congestion, sinus pain and trouble swallowing  Eyes: Negative for discharge and visual disturbance  Respiratory: Negative for cough, chest tightness, shortness of breath and wheezing  Cardiovascular: Negative for chest pain, palpitations and leg swelling  Gastrointestinal: Negative for abdominal pain, blood in stool, constipation, diarrhea, nausea and vomiting  Genitourinary: Negative for difficulty urinating, dysuria, frequency and hematuria  Musculoskeletal: Negative for arthralgias, gait problem and joint swelling  Skin: Negative for rash and wound  Allergic/Immunologic: Negative for environmental allergies and food allergies  Neurological: Negative for dizziness, syncope, weakness, numbness and headaches  Hematological: Negative for adenopathy  Does not bruise/bleed easily  Psychiatric/Behavioral: Negative for confusion, decreased concentration and sleep disturbance  The patient is not nervous/anxious         Past Medical History:     Past Medical History:   Diagnosis Date    Obesity       Past Surgical History:     Past Surgical History:   Procedure Laterality Date    BREAST BIOPSY Left 08/29/2019    benign    BREAST CYST ASPIRATION Left 08/29/2019    benign    DE QUERVAIN'S RELEASE      GALLBLADDER SURGERY      US BREAST CYST ASPIRATION LEFT INITIAL Left 8/29/2019      Social History:     Social History     Socioeconomic History    Marital status: /Civil Union     Spouse name: None    Number of children: None    Years of education: None    Highest education level: None   Occupational History    None   Tobacco Use    Smoking status: Never Smoker    Smokeless tobacco: Never Used   Vaping Use    Vaping Use: Never used   Substance and Sexual Activity    Alcohol use: Not Currently    Drug use: Never    Sexual activity: Yes     Partners: Male     Birth control/protection: None   Other Topics Concern    None   Social History Narrative    None     Social Determinants of Health     Financial Resource Strain: Not on file   Food Insecurity: Not on file   Transportation Needs: Not on file   Physical Activity: Not on file   Stress: Not on file   Social Connections: Not on file   Intimate Partner Violence: Not on file   Housing Stability: Not on file      Family History:     Family History   Problem Relation Age of Onset    Diabetes Mother     Kidney failure Mother     Diabetes Father     No Known Problems Sister     No Known Problems Daughter     No Known Problems Maternal Grandmother     No Known Problems Maternal Grandfather     No Known Problems Paternal Grandmother     No Known Problems Paternal Grandfather     Hypertension Sister     Hypothyroidism Sister     Breast cancer Half-Sister 52    Colon cancer Half-Sister 62    Breast cancer additional onset Half-Sister         total 4xs breast cancer    Endometrial cancer Half-Sister 47    Kidney failure Half-Sister     No Known Problems Maternal Aunt     No Known Problems Maternal Aunt     No Known Problems Paternal Aunt     No Known Problems Paternal Aunt     Substance Abuse Half-Brother     No Known Problems Half-Brother       Current Medications:     No current outpatient medications on file  No current facility-administered medications for this visit  Allergies:     No Known Allergies   Physical Exam:     /84   Ht 5' (1 524 m)   Wt 121 kg (266 lb 3 2 oz)   BMI 51 99 kg/m²     Physical Exam  Vitals and nursing note reviewed  Constitutional:       Appearance: She is well-developed  She is obese  HENT:      Head: Normocephalic and atraumatic  Right Ear: External ear normal       Left Ear: External ear normal       Nose: Nose normal       Mouth/Throat:      Pharynx: No oropharyngeal exudate     Eyes: Extraocular Movements: Extraocular movements intact  Conjunctiva/sclera: Conjunctivae normal       Pupils: Pupils are equal, round, and reactive to light  Neck:      Thyroid: No thyromegaly  Trachea: No tracheal deviation  Cardiovascular:      Rate and Rhythm: Normal rate and regular rhythm  Heart sounds: Normal heart sounds  Pulmonary:      Effort: Pulmonary effort is normal  No respiratory distress  Breath sounds: Normal breath sounds  No wheezing or rales  Abdominal:      General: Bowel sounds are normal       Palpations: Abdomen is soft  Musculoskeletal:         General: Normal range of motion  Cervical back: Normal range of motion and neck supple  Lymphadenopathy:      Cervical: No cervical adenopathy  Skin:     General: Skin is warm  Findings: No rash  Neurological:      General: No focal deficit present  Mental Status: She is alert and oriented to person, place, and time  Cranial Nerves: No cranial nerve deficit  Motor: No abnormal muscle tone  Psychiatric:         Mood and Affect: Mood normal          Behavior: Behavior normal          Thought Content: Thought content normal          Judgment: Judgment normal           Krystal Bardales DO  [facility] BMI Counseling: Body mass index is 51 99 kg/m²  The BMI is above normal  Nutrition recommendations include reducing portion sizes, decreasing overall calorie intake, 3-5 servings of fruits/vegetables daily, moderation in carbohydrate intake and increasing intake of lean protein  Exercise recommendations include exercising 3-5 times per week

## 2022-04-11 NOTE — PROGRESS NOTES
Assessment/Plan:    No problem-specific Assessment & Plan notes found for this encounter  There are no diagnoses linked to this encounter  Subjective:   Chief Complaint   Patient presents with    Annual Exam          Patient ID: Bala Landrum is a 45 y o  female      HPI    The following portions of the patient's history were reviewed and updated as appropriate: allergies, current medications, past family history, past medical history, past social history, past surgical history and problem list     Review of Systems      Objective:      /84   Ht 5' (1 524 m)   Wt 121 kg (266 lb 3 2 oz)   BMI 51 99 kg/m²          Physical Exam

## 2022-04-11 NOTE — ASSESSMENT & PLAN NOTE
Patient has history of PCOS Patient is also having spotting for months She has had not seen GYN in some time no insurance

## 2022-04-11 NOTE — PATIENT INSTRUCTIONS
Wellness Visit for Adults   AMBULATORY CARE:   A wellness visit  is when you see your healthcare provider to get screened for health problems  Your healthcare provider will also give you advice on how to stay healthy  Write down your questions so you remember to ask them  Ask your healthcare provider how often you should have a wellness visit  What happens at a wellness visit:  Your healthcare provider will ask about your health, and your family history of health problems  This includes high blood pressure, heart disease, and cancer  He or she will ask if you have symptoms that concern you, if you smoke, and about your mood  You may also be asked about your intake of medicines, supplements, food, and alcohol  Any of the following may be done:  · Your weight  will be checked  Your height may also be checked so your body mass index (BMI) can be calculated  Your BMI shows if you are at a healthy weight  · Your blood pressure  and heart rate will be checked  Your temperature may also be checked  · Blood and urine tests  may be done  Blood tests may be done to check your cholesterol levels  Abnormal cholesterol levels increase your risk for heart disease and stroke  You may also need a blood or urine test to check for diabetes if you are at increased risk  Urine tests may be done to look for signs of an infection or kidney disease  · A physical exam  includes checking your heartbeat and lungs with a stethoscope  Your healthcare provider may also check your skin to look for sun damage  · Screening tests  may be recommended  A screening test is done to check for diseases that may not cause symptoms  The screening tests you may need depend on your age, gender, family history, and lifestyle habits  For example, colorectal screening may be recommended if you are 48years old or older  Screening tests you need if you are a woman:   · A Pap smear  is used to screen for cervical cancer   Pap smears are usually done every 3 to 5 years depending on your age  You may need them more often if you have had abnormal Pap smear test results in the past  Ask your healthcare provider how often you should have a Pap smear  · A mammogram  is an x-ray of your breasts to screen for breast cancer  Experts recommend mammograms every 2 years starting at age 48 years  You may need a mammogram at age 52 years or younger if you have an increased risk for breast cancer  Talk to your healthcare provider about when you should start having mammograms and how often you need them  Vaccines you may need:   · Get an influenza vaccine  every year  The influenza vaccine protects you from the flu  Several types of viruses cause the flu  The viruses change over time, so new vaccines are made each year  · Get a tetanus-diphtheria (Td) booster vaccine  every 10 years  This vaccine protects you against tetanus and diphtheria  Tetanus is a severe infection that may cause painful muscle spasms and lockjaw  Diphtheria is a severe bacterial infection that causes a thick covering in the back of your mouth and throat  · Get a human papillomavirus (HPV) vaccine  if you are female and aged 23 to 32 or male 23 to 24 and never received it  This vaccine protects you from HPV infection  HPV is the most common infection spread by sexual contact  HPV may also cause vaginal, penile, and anal cancers  · Get a pneumococcal vaccine  if you are aged 72 years or older  The pneumococcal vaccine is an injection given to protect you from pneumococcal disease  Pneumococcal disease is an infection caused by pneumococcal bacteria  The infection may cause pneumonia, meningitis, or an ear infection  · Get a shingles vaccine  if you are 60 or older, even if you have had shingles before  The shingles vaccine is an injection to protect you from the varicella-zoster virus  This is the same virus that causes chickenpox   Shingles is a painful rash that develops in people who had chickenpox or have been exposed to the virus  How to eat healthy:  My Plate is a model for planning healthy meals  It shows the types and amounts of foods that should go on your plate  Fruits and vegetables make up about half of your plate, and grains and protein make up the other half  A serving of dairy is included on the side of your plate  The amount of calories and serving sizes you need depends on your age, gender, weight, and height  Examples of healthy foods are listed below:  · Eat a variety of vegetables  such as dark green, red, and orange vegetables  You can also include canned vegetables low in sodium (salt) and frozen vegetables without added butter or sauces  · Eat a variety of fresh fruits , canned fruit in 100% juice, frozen fruit, and dried fruit  · Include whole grains  At least half of the grains you eat should be whole grains  Examples include whole-wheat bread, wheat pasta, brown rice, and whole-grain cereals such as oatmeal     · Eat a variety of protein foods such as seafood (fish and shellfish), lean meat, and poultry without skin (turkey and chicken)  Examples of lean meats include pork leg, shoulder, or tenderloin, and beef round, sirloin, tenderloin, and extra lean ground beef  Other protein foods include eggs and egg substitutes, beans, peas, soy products, nuts, and seeds  · Choose low-fat dairy products such as skim or 1% milk or low-fat yogurt, cheese, and cottage cheese  · Limit unhealthy fats  such as butter, hard margarine, and shortening  Exercise:  Exercise at least 30 minutes per day on most days of the week  Some examples of exercise include walking, biking, dancing, and swimming  You can also fit in more physical activity by taking the stairs instead of the elevator or parking farther away from stores  Include muscle strengthening activities 2 days each week  Regular exercise provides many health benefits   It helps you manage your weight, and decreases your risk for type 2 diabetes, heart disease, stroke, and high blood pressure  Exercise can also help improve your mood  Ask your healthcare provider about the best exercise plan for you  General health and safety guidelines:   · Do not smoke  Nicotine and other chemicals in cigarettes and cigars can cause lung damage  Ask your healthcare provider for information if you currently smoke and need help to quit  E-cigarettes or smokeless tobacco still contain nicotine  Talk to your healthcare provider before you use these products  · Limit alcohol  A drink of alcohol is 12 ounces of beer, 5 ounces of wine, or 1½ ounces of liquor  · Lose weight, if needed  Being overweight increases your risk of certain health conditions  These include heart disease, high blood pressure, type 2 diabetes, and certain types of cancer  · Protect your skin  Do not sunbathe or use tanning beds  Use sunscreen with a SPF 15 or higher  Apply sunscreen at least 15 minutes before you go outside  Reapply sunscreen every 2 hours  Wear protective clothing, hats, and sunglasses when you are outside  · Drive safely  Always wear your seatbelt  Make sure everyone in your car wears a seatbelt  A seatbelt can save your life if you are in an accident  Do not use your cell phone when you are driving  This could distract you and cause an accident  Pull over if you need to make a call or send a text message  · Practice safe sex  Use latex condoms if are sexually active and have more than one partner  Your healthcare provider may recommend screening tests for sexually transmitted infections (STIs)  · Wear helmets, lifejackets, and protective gear  Always wear a helmet when you ride a bike or motorcycle, go skiing, or play sports that could cause a head injury  Wear protective equipment when you play sports  Wear a lifejacket when you are on a boat or doing water sports      © Copyright [employer] 2022 Information is for End User's use only and may not be sold, redistributed or otherwise used for commercial purposes  All illustrations and images included in CareNotes® are the copyrighted property of A D A M [employer] Inc  or Lisa Mancuso  The above information is an  only  It is not intended as medical advice for individual conditions or treatments  Talk to your doctor, nurse or pharmacist before following any medical regimen to see if it is safe and effective for you  Obesity   AMBULATORY CARE:   Obesity  means your body mass index (BMI) is greater than 30  Your healthcare provider will use your height and weight to measure your BMI  The risks of obesity include  many health problems, including injuries or physical disability  · Diabetes (high blood sugar level)    · High blood pressure or high cholesterol    · Heart disease    · Stroke    · Gallbladder or liver disease    · Cancer of the colon, breast, prostate, liver, or kidney    · Sleep apnea    · Arthritis or gout    Screening  is done to check for health conditions before you have signs or symptoms  If you are 28to 79years old, your blood sugar level may be checked every 3 years for signs of prediabetes or diabetes  Your healthcare provider will check your blood pressure at each visit  High blood pressure can lead to a stroke or other problems  Your provider may check for signs of heart disease, cancer, or other health problems  Seek care immediately if:   · You have a severe headache, confusion, or difficulty speaking  · You have weakness on one side of your body  · You have chest pain, sweating, or shortness of breath  Call your doctor if:   · You have symptoms of gallbladder or liver disease, such as pain in your upper abdomen  · You have knee or hip pain and discomfort while walking  · You have symptoms of diabetes, such as intense hunger and thirst, and frequent urination      · You have symptoms of sleep apnea, such as snoring or daytime sleepiness  · You have questions or concerns about your condition or care  Treatment for obesity  focuses on helping you lose weight to improve your health  Even a small decrease in BMI can reduce the risk for many health problems  Your healthcare provider will help you set a weight-loss goal   · Lifestyle changes  are the first step in treating obesity  These include making healthy food choices and getting regular physical activity  Your healthcare provider may suggest a weight-loss program that involves coaching, education, and therapy  · Medicine  may help you lose weight when it is used with a healthy foods and physical activity  · Surgery  can help you lose weight if you are very obese and have other health problems  There are several types of weight-loss surgery  Ask your healthcare provider for more information  Tips for safe weight loss:   · Set small, realistic goals  An example of a small goal is to walk for 20 minutes 5 days a week  Anther goal is to lose 5% of your body weight  · Tell friends, family members, and coworkers about your goals  and ask for their support  Ask a friend to lose weight with you, or join a weight-loss support group  · Identify foods or triggers that may cause you to overeat , and find ways to avoid them  Remove tempting high-calorie foods from your home and workplace  Place a bowl of fresh fruit on your kitchen counter  If stress causes you to eat, then find other ways to cope with stress  A counselor or therapist may be able to help you  · Keep a diary to track what you eat and drink  Also write down how many minutes of physical activity you do each day  Weigh yourself once a week and record it in your diary  Eating changes: You will need to eat 500 to 1,000 fewer calories each day than you currently eat to lose 1 to 2 pounds a week  The following changes will help you cut calories:  · Eat smaller portions    Use small plates, no larger than 9 inches in diameter  Fill your plate half full of fruits and vegetables  Measure your food using measuring cups until you know what a serving size looks like  · Eat 3 meals and 1 or 2 snacks each day  Plan your meals in advance  Adelitaleslie Childs and eat at home most of the time  Eat slowly  Do not skip meals  Skipping meals can lead to overeating later in the day  This can make it harder for you to lose weight  Talk with a dietitian to help you make a meal plan and schedule that is right for you  · Eat fruits and vegetables at every meal   They are low in calories and high in fiber, which makes you feel full  Do not add butter, margarine, or cream sauce to vegetables  Use herbs to season steamed vegetables  · Eat less fat and fewer fried foods  Eat more baked or grilled chicken and fish  These protein sources are lower in calories and fat than red meat  Limit fast food  Dress your salads with olive oil and vinegar instead of bottled dressing  · Limit the amount of sugar you eat  Do not drink sugary beverages  Limit alcohol  Activity changes:  Physical activity is good for your body in many ways  It helps you burn calories and build strong muscles  It decreases stress and depression, and improves your mood  It can also help you sleep better  Talk to your healthcare provider before you begin an exercise program   · Exercise for at least 30 minutes 5 days a week  Start slowly  Set aside time each day for physical activity that you enjoy and that is convenient for you  It is best to do both weight training and an activity that increases your heart rate, such as walking, bicycling, or swimming  · Find ways to be more active  Do yard work and housecleaning  Walk up the stairs instead of using elevators  Spend your leisure time going to events that require walking, such as outdoor festivals or fairs  This extra physical activity can help you lose weight and keep it off         Follow up with your doctor as directed: You may need to meet with a dietitian  Write down your questions so you remember to ask them during your visits  © Copyright [employer] 2022 Information is for End User's use only and may not be sold, redistributed or otherwise used for commercial purposes  All illustrations and images included in CareNotes® are the copyrighted property of A [employer] A M , Inc  or Lisa Amor   The above information is an  only  It is not intended as medical advice for individual conditions or treatments  Talk to your doctor, nurse or pharmacist before following any medical regimen to see if it is safe and effective for you  Obesity   AMBULATORY CARE:   Obesity  means your body mass index (BMI) is greater than 30  Your healthcare provider will use your height and weight to measure your BMI  The risks of obesity include  many health problems, including injuries or physical disability  · Diabetes (high blood sugar level)    · High blood pressure or high cholesterol    · Heart disease    · Stroke    · Gallbladder or liver disease    · Cancer of the colon, breast, prostate, liver, or kidney    · Sleep apnea    · Arthritis or gout    Screening  is done to check for health conditions before you have signs or symptoms  If you are 28to 79years old, your blood sugar level may be checked every 3 years for signs of prediabetes or diabetes  Your healthcare provider will check your blood pressure at each visit  High blood pressure can lead to a stroke or other problems  Your provider may check for signs of heart disease, cancer, or other health problems  Seek care immediately if:   · You have a severe headache, confusion, or difficulty speaking  · You have weakness on one side of your body  · You have chest pain, sweating, or shortness of breath  Call your doctor if:   · You have symptoms of gallbladder or liver disease, such as pain in your upper abdomen      · You have knee or hip pain and discomfort while walking  · You have symptoms of diabetes, such as intense hunger and thirst, and frequent urination  · You have symptoms of sleep apnea, such as snoring or daytime sleepiness  · You have questions or concerns about your condition or care  Treatment for obesity  focuses on helping you lose weight to improve your health  Even a small decrease in BMI can reduce the risk for many health problems  Your healthcare provider will help you set a weight-loss goal   · Lifestyle changes  are the first step in treating obesity  These include making healthy food choices and getting regular physical activity  Your healthcare provider may suggest a weight-loss program that involves coaching, education, and therapy  · Medicine  may help you lose weight when it is used with a healthy foods and physical activity  · Surgery  can help you lose weight if you are very obese and have other health problems  There are several types of weight-loss surgery  Ask your healthcare provider for more information  Tips for safe weight loss:   · Set small, realistic goals  An example of a small goal is to walk for 20 minutes 5 days a week  Anther goal is to lose 5% of your body weight  · Tell friends, family members, and coworkers about your goals  and ask for their support  Ask a friend to lose weight with you, or join a weight-loss support group  · Identify foods or triggers that may cause you to overeat , and find ways to avoid them  Remove tempting high-calorie foods from your home and workplace  Place a bowl of fresh fruit on your kitchen counter  If stress causes you to eat, then find other ways to cope with stress  A counselor or therapist may be able to help you  · Keep a diary to track what you eat and drink  Also write down how many minutes of physical activity you do each day  Weigh yourself once a week and record it in your diary  Eating changes:   You will need to eat 500 to 1,000 fewer calories each day than you currently eat to lose 1 to 2 pounds a week  The following changes will help you cut calories:  · Eat smaller portions  Use small plates, no larger than 9 inches in diameter  Fill your plate half full of fruits and vegetables  Measure your food using measuring cups until you know what a serving size looks like  · Eat 3 meals and 1 or 2 snacks each day  Plan your meals in advance  Adelita Childs and eat at home most of the time  Eat slowly  Do not skip meals  Skipping meals can lead to overeating later in the day  This can make it harder for you to lose weight  Talk with a dietitian to help you make a meal plan and schedule that is right for you  · Eat fruits and vegetables at every meal   They are low in calories and high in fiber, which makes you feel full  Do not add butter, margarine, or cream sauce to vegetables  Use herbs to season steamed vegetables  · Eat less fat and fewer fried foods  Eat more baked or grilled chicken and fish  These protein sources are lower in calories and fat than red meat  Limit fast food  Dress your salads with olive oil and vinegar instead of bottled dressing  · Limit the amount of sugar you eat  Do not drink sugary beverages  Limit alcohol  Activity changes:  Physical activity is good for your body in many ways  It helps you burn calories and build strong muscles  It decreases stress and depression, and improves your mood  It can also help you sleep better  Talk to your healthcare provider before you begin an exercise program   · Exercise for at least 30 minutes 5 days a week  Start slowly  Set aside time each day for physical activity that you enjoy and that is convenient for you  It is best to do both weight training and an activity that increases your heart rate, such as walking, bicycling, or swimming  · Find ways to be more active  Do yard work and housecleaning  Walk up the stairs instead of using elevators   Spend your leisure time going to events that require walking, such as outdoor festivals or fairs  This extra physical activity can help you lose weight and keep it off  Follow up with your doctor as directed: You may need to meet with a dietitian  Write down your questions so you remember to ask them during your visits  © Copyright [employer] 2022 Information is for End User's use only and may not be sold, redistributed or otherwise used for commercial purposes  All illustrations and images included in CareNotes® are the copyrighted property of [employer] M [employer] Inc  or Lisa Amor   The above information is an  only  It is not intended as medical advice for individual conditions or treatments  Talk to your doctor, nurse or pharmacist before following any medical regimen to see if it is safe and effective for you

## 2022-04-11 NOTE — ASSESSMENT & PLAN NOTE
Contacted  and also suggested [facility] for her to get the GYN care she needs and also colon cancer screening

## 2022-04-11 NOTE — ASSESSMENT & PLAN NOTE
Recommended diet and exercise as well as yearly flu shot covid vaccine  Patient also needs to see Gyn due to her excessive bleeding and alos needs colon cancer screening She is refusing these things due to lack of insurance

## 2022-04-11 NOTE — ASSESSMENT & PLAN NOTE
Discussed diet and exercise with patient We also discussed screening for diabetes and lipids I also discussed covid vaccination with patient

## 2022-04-12 ENCOUNTER — PATIENT OUTREACH (OUTPATIENT)
Dept: FAMILY MEDICINE CLINIC | Facility: CLINIC | Age: 52
End: 2022-04-12

## 2022-04-12 NOTE — PROGRESS NOTES
OP CM attempted to call pts home number [phone] and rcvd continual busy signal   Also called to pts work number [phone] and this number is disconnected  Called to pts dtrs number [phone] and left message  Also sent letter to pt as pts email is not working

## 2022-04-12 NOTE — LETTER
April 12, 2022     Danya Sommer  [address] [id number]    Patient: Danya Sommer   YOB: 1970   Date of Visit:      Dear Ms Ghulam Mejias,    [address] is committed to your health and satisfaction  We often contact patients about services to help manage your health  These services are available to you at no extra charge  I have not been able to contact you by phone  If you are interested in extra help please call in the next 10 business days  Please contact me directly at [phone] Monday through Friday from 8:30am to 4:30pm     I look forward to hearing from you      Sincerely,     Leonarda Wilson  Outpatient Care Management

## 2022-04-14 ENCOUNTER — PATIENT OUTREACH (OUTPATIENT)
Dept: FAMILY MEDICINE CLINIC | Facility: CLINIC | Age: 52
End: 2022-04-14

## 2022-04-14 NOTE — PROGRESS NOTES
OP CM called to pt in regards to not having insurance  Pt states she is getting a job next week at [employer] but it will be part time  Pt states she is overincome for medicaid because her [employer] works two jobs but they do not offer insurance  Pt states she has not worked since 2017  Pt discussed the price of marketplace insurance and her frustration  Pt states the cost is so high  Emotional support provided  Explained deangelo care to pt and emailed her the application  Pt is aware that deangelo care is retroactive  OP CM will continue to follow

## 2022-04-26 ENCOUNTER — PATIENT OUTREACH (OUTPATIENT)
Dept: FAMILY MEDICINE CLINIC | Facility: CLINIC | Age: 52
End: 2022-04-26

## 2022-08-29 ENCOUNTER — PATIENT OUTREACH (OUTPATIENT)
Dept: FAMILY MEDICINE CLINIC | Facility: CLINIC | Age: 52
End: 2022-08-29

## 2023-01-04 ENCOUNTER — OFFICE VISIT (OUTPATIENT)
Dept: OBGYN CLINIC | Facility: CLINIC | Age: 53
End: 2023-01-04

## 2023-01-04 VITALS
OXYGEN SATURATION: 99 % | HEART RATE: 67 BPM | DIASTOLIC BLOOD PRESSURE: 80 MMHG | SYSTOLIC BLOOD PRESSURE: 128 MMHG | BODY MASS INDEX: 54.38 KG/M2 | HEIGHT: 60 IN | WEIGHT: 277 LBS

## 2023-01-04 DIAGNOSIS — M17.12 PRIMARY OSTEOARTHRITIS OF LEFT KNEE: Primary | ICD-10-CM

## 2023-01-04 DIAGNOSIS — Z12.11 ENCOUNTER FOR SCREENING COLONOSCOPY: ICD-10-CM

## 2023-01-04 PROBLEM — M25.562 ACUTE PAIN OF LEFT KNEE: Status: ACTIVE | Noted: 2023-01-04

## 2023-01-04 NOTE — PROGRESS NOTES
Patient Name:  Justyn Kee  MRN:  [id number]    Assessment & Plan     Left knee DJD  1  Referral for physical therapy  2  Short hinged knee brace provided today for comfort  3  Offered prescription anti-inflammatory and intra-articular corticosteroid injection  Patient declined  Patient states she will reinitiate holistic turmeric that she has taken in the past   4  Follow-up in 6 weeks  Consider prescription strength anti-inflammatory or intra-articular corticosteroid injection at that time if pain persists or worsens  Chief Complaint     Left knee pain    History of the Present Illness     Justyn Kee is a 47 y o  female who reports to the office today for evaluation of her left knee  She notes an onset of pain approximately 2 months ago  She denies any specific injury or trauma  She notes primarily anterior and posterior knee pain worse with prolonged standing and walking specifically up and down steps  She also notes pain with bent knee activities  She does note mild swelling  She describes the pain as deep within the knee and states it is sharp on occasion  She notes occasional episodes of giving out in the knee  She denies any significant stiffness  She currently takes nothing for pain  No numbness or tingling  No fevers or chills  Physical Exam     /80   Pulse 67   Ht 5' (1 524 m)   Wt 126 kg (277 lb)   SpO2 99%   BMI 54 10 kg/m²     Left knee: No gross deformity  No effusion  No significant joint line tenderness  Full range of motion with discomfort noted at terminal flexion  Crepitation noted in the patellofemoral compartment  Stable to varus leg stress without pain  Stable Lachman's  Negative posterior drawer test   Negative Braswell test   Positive patellar grind test     Eyes: Anicteric sclerae  ENT: Trachea midline  Lungs: Normal respiratory effort  CV: Capillary refill is less than 2 seconds  Skin: Intact without erythema    Lymph: No palpable lymphadenopathy  Neuro: Sensation is grossly intact to light touch  Psych: Mood and affect are appropriate  Data Review     I have personally reviewed pertinent films in PACS, and my interpretation follows:    X-rays left knee 1/4/2023: No acute osseous abnormality  No fracture or dislocation  Mild tricompartmental degenerative changes    Past Medical History:   Diagnosis Date   • Obesity        Past Surgical History:   Procedure Laterality Date   • BREAST BIOPSY Left 08/29/2019    benign   • BREAST CYST ASPIRATION Left 08/29/2019    benign   • DE QUERVAIN'S RELEASE     • GALLBLADDER SURGERY     • US BREAST CYST ASPIRATION LEFT INITIAL Left 8/29/2019       No Known Allergies    No current outpatient medications on file prior to visit  No current facility-administered medications on file prior to visit  Social History     Tobacco Use   • Smoking status: Never   • Smokeless tobacco: Never   Vaping Use   • Vaping Use: Never used   Substance Use Topics   • Alcohol use: Not Currently   • Drug use: Never       Family History   Problem Relation Age of Onset   • Diabetes Mother    • Kidney failure Mother    • Diabetes Father    • No Known Problems Sister    • No Known Problems Daughter    • No Known Problems Maternal Grandmother    • No Known Problems Maternal Grandfather    • No Known Problems Paternal Grandmother    • No Known Problems Paternal Grandfather    • Hypertension Sister    • Hypothyroidism Sister    • Breast cancer Half-Sister 52   • Colon cancer Half-Sister 62   • Breast cancer additional onset Half-Sister         total 4xs breast cancer   • Endometrial cancer Half-Sister 47   • Kidney failure Half-Sister    • No Known Problems Maternal Aunt    • No Known Problems Maternal Aunt    • No Known Problems Paternal Aunt    • No Known Problems Paternal Aunt    • Substance Abuse Half-Brother    • No Known Problems Half-Brother        Review of Systems     As stated in the HPI   All other systems reviewed and are negative

## 2024-10-24 ENCOUNTER — APPOINTMENT (EMERGENCY)
Dept: RADIOLOGY | Facility: HOSPITAL | Age: 54
End: 2024-10-24
Payer: COMMERCIAL

## 2024-10-24 ENCOUNTER — TELEPHONE (OUTPATIENT)
Age: 54
End: 2024-10-24

## 2024-10-24 ENCOUNTER — HOSPITAL ENCOUNTER (EMERGENCY)
Facility: HOSPITAL | Age: 54
Discharge: HOME | End: 2024-10-24
Attending: EMERGENCY MEDICINE
Payer: COMMERCIAL

## 2024-10-24 VITALS
TEMPERATURE: 97.7 F | WEIGHT: 288.8 LBS | BODY MASS INDEX: 56.4 KG/M2 | DIASTOLIC BLOOD PRESSURE: 73 MMHG | SYSTOLIC BLOOD PRESSURE: 181 MMHG | OXYGEN SATURATION: 100 % | HEART RATE: 66 BPM | RESPIRATION RATE: 17 BRPM

## 2024-10-24 DIAGNOSIS — M79.642 LEFT HAND PAIN: Primary | ICD-10-CM

## 2024-10-24 DIAGNOSIS — M25.532 LEFT WRIST PAIN: ICD-10-CM

## 2024-10-24 PROCEDURE — 99283 EMERGENCY DEPT VISIT LOW MDM: CPT

## 2024-10-24 PROCEDURE — 96372 THER/PROPH/DIAG INJ SC/IM: CPT

## 2024-10-24 PROCEDURE — 73110 X-RAY EXAM OF WRIST: CPT

## 2024-10-24 PROCEDURE — 99284 EMERGENCY DEPT VISIT MOD MDM: CPT

## 2024-10-24 PROCEDURE — 73130 X-RAY EXAM OF HAND: CPT

## 2024-10-24 RX ORDER — KETOROLAC TROMETHAMINE 30 MG/ML
15 INJECTION, SOLUTION INTRAMUSCULAR; INTRAVENOUS ONCE
Status: COMPLETED | OUTPATIENT
Start: 2024-10-24 | End: 2024-10-24

## 2024-10-24 RX ORDER — ACETAMINOPHEN 325 MG/1
975 TABLET ORAL ONCE
Status: COMPLETED | OUTPATIENT
Start: 2024-10-24 | End: 2024-10-24

## 2024-10-24 RX ADMIN — KETOROLAC TROMETHAMINE 15 MG: 30 INJECTION, SOLUTION INTRAMUSCULAR; INTRAVENOUS at 11:54

## 2024-10-24 RX ADMIN — ACETAMINOPHEN 975 MG: 325 TABLET, FILM COATED ORAL at 11:53

## 2024-10-24 NOTE — Clinical Note
Mae Coleman was seen and treated in our emergency department on 10/24/2024.                Diagnosis:     Mae  .    She may return on this date:          If you have any questions or concerns, please don't hesitate to call.      Jewel Marion PA-C    ______________________________           _______________          _______________  Hospital Representative                              Date                                Time

## 2024-10-24 NOTE — DISCHARGE INSTRUCTIONS
Take 650mg of acetaminophen (Tylenol) with 400 mg of ibuprofen (Advil) every 8 hours as needed for pain.  Lidocaine patches are available over-the-counter can be found in the back pain aisle of most pharmacies.  Look for 4% lidocaine in the list of the active ingredients.  These patches can be placed for 12 hours.  After 12 hours, discard the patch.  The next patch can be placed 12 hours later.  Please follow-up with orthopedics for further evaluation and management if you have no improvement in your symptoms in the next several days.  Return to the ED if you develop any new or worsening symptoms.

## 2024-10-24 NOTE — TELEPHONE ENCOUNTER
Hello,    Please advise if a forced appointment can be accommodated for the patient:    Call back #: [phone]     Insurance: [employer]    Reason for appointment: patient was seen in ED referred to Dr Gilbert for left hand pain    Requested doctor and/or location: Dr Gilbert/[facility] office       Thank you.

## 2024-10-24 NOTE — ED PROVIDER NOTES
Time reflects when diagnosis was documented in both MDM as applicable and the Disposition within this note       Time User Action Codes Description Comment    10/24/2024 11:56 AM Jewel Marion [M79.642] Left hand pain     10/24/2024 11:56 AM Jewel Marion [M25.532] Left wrist pain           ED Disposition       ED Disposition   Discharge    Condition   Stable    Date/Time   Thu Oct 24, 2024 11:56 AM    Comment   Mae Coleman discharge to home/self care.                   Assessment & Plan       Medical Decision Making  54-year-old female presents to the ED for evaluation of left hand and wrist pain that began yesterday.  Patient denies any falls or injury, of note does state that she works as a  types frequently and uses her hands frequently throughout the day.  Patient afebrile normal vital signs in ED, well-appearing on exam.  Mild tenderness over dorsal aspect of left wrist, otherwise benign exam.  No signs of erythema or swelling, no signs of open wound to the left wrist or left hand.  X-rays negative for acute fracture or dislocation.  Patient given Tylenol and Toradol in ED.  Did offer patient a brace which she states she has multiple at home.  Patient given referral information for orthopedics, advised to follow-up with orthopedics for further evaluation and management no improvement in symptoms in the next several days.  ED return precautions discussed with patient.  Patient verbalized understanding and agreement with plan.    Amount and/or Complexity of Data Reviewed  Radiology: ordered. Decision-making details documented in ED Course.    Risk  OTC drugs.  Prescription drug management.        ED Course as of 10/24/24 1701   Thu Oct 24, 2024   1144 XR wrist 3+ views LEFT  IMPRESSION:     No acute osseous abnormality.     First carpometacarpal osteoarthritis.        1144 XR hand 3+ views LEFT  IMPRESSION:     No acute osseous abnormality.     First carpometacarpal osteoarthritis.             Medications   ketorolac (TORADOL) injection 15 mg (15 mg Intramuscular Given 10/24/24 1154)   acetaminophen (TYLENOL) tablet 975 mg (975 mg Oral Given 10/24/24 1153)       ED Risk Strat Scores                           SBIRT 22yo+      Flowsheet Row Most Recent Value   Initial Alcohol Screen: US AUDIT-C     1. How often do you have a drink containing alcohol? 0 Filed at: 10/24/2024 1003   2. How many drinks containing alcohol do you have on a typical day you are drinking?  0 Filed at: 10/24/2024 1003   3b. FEMALE Any Age, or MALE 65+: How often do you have 4 or more drinks on one occassion? 0 Filed at: 10/24/2024 1003   Audit-C Score 0 Filed at: 10/24/2024 1003   DORETHA: How many times in the past year have you...    Used an illegal drug or used a prescription medication for non-medical reasons? Never Filed at: 10/24/2024 1003                            History of Present Illness       Chief Complaint   Patient presents with    Hand Pain     Pt reports left hand & wrist pain since yesterday. Denies injury        Past Medical History:   Diagnosis Date    Obesity       Past Surgical History:   Procedure Laterality Date    BREAST BIOPSY Left 08/29/2019    benign    BREAST CYST ASPIRATION Left 08/29/2019    benign    DE QUERVAIN'S RELEASE      GALLBLADDER SURGERY      US BREAST CYST ASPIRATION LEFT INITIAL Left 8/29/2019      Family History   Problem Relation Age of Onset    Diabetes Mother     Kidney failure Mother     Diabetes Father     No Known Problems Sister     No Known Problems Daughter     No Known Problems Maternal Grandmother     No Known Problems Maternal Grandfather     No Known Problems Paternal Grandmother     No Known Problems Paternal Grandfather     Hypertension Sister     Hypothyroidism Sister     Breast cancer Half-Sister 49    Colon cancer Half-Sister 57    Breast cancer additional onset Half-Sister         total 4xs breast cancer    Endometrial cancer Half-Sister 54    Kidney failure  Half-Sister     No Known Problems Maternal Aunt     No Known Problems Maternal Aunt     No Known Problems Paternal Aunt     No Known Problems Paternal Aunt     Substance Abuse Half-Brother     No Known Problems Half-Brother       Social History     Tobacco Use    Smoking status: Never    Smokeless tobacco: Never   Vaping Use    Vaping status: Never Used   Substance Use Topics    Alcohol use: Never    Drug use: Never      E-Cigarette/Vaping    E-Cigarette Use Never User       E-Cigarette/Vaping Substances      I have reviewed and agree with the history as documented.     This a 54-year-old female who presents to the ED for evaluation of hand and wrist pain that began yesterday.  Patient denies any falls or injury, states pain in the dorsal aspect of her left hand that radiates up her wrist.  Denies any weakness or loss of sensation of her left upper extremity.  Patient does report that she works as a , states she typically spends several hours a day typing and using her hands at work.  Does report prior de Quervain's release several years ago on the same hand.  States she did not take anything at home for pain or discomfort.  She denies any other acute concerns or complaints at this time including recent fevers, chills, headaches lightheadedness, chest pain, SOB, Tien pain, NVD, dysuria.  She denies any left forearm, elbow, or shoulder pain.      Hand Pain  Associated symptoms: no abdominal pain, no chest pain, no cough, no diarrhea, no fever, no headaches, no nausea, no rash, no shortness of breath and no vomiting        Review of Systems   Constitutional:  Negative for chills and fever.   Eyes:  Negative for photophobia and visual disturbance.   Respiratory:  Negative for cough and shortness of breath.    Cardiovascular:  Negative for chest pain and palpitations.   Gastrointestinal:  Negative for abdominal pain, diarrhea, nausea and vomiting.   Genitourinary:  Negative for difficulty urinating and  dysuria.   Musculoskeletal:  Positive for arthralgias (Left hand and wrist pain). Negative for neck pain and neck stiffness.   Skin:  Negative for rash and wound.   Neurological:  Negative for dizziness, syncope, weakness, light-headedness, numbness and headaches.           Objective       ED Triage Vitals   Temperature Pulse Blood Pressure Respirations SpO2 Patient Position - Orthostatic VS   10/24/24 1003 10/24/24 1003 10/24/24 1003 10/24/24 1003 10/24/24 1003 10/24/24 1003   97.7 °F (36.5 °C) 66 (!) 181/73 17 100 % Sitting      Temp Source Heart Rate Source BP Location FiO2 (%) Pain Score    10/24/24 1003 10/24/24 1003 10/24/24 1003 -- 10/24/24 1153    Oral Monitor Right arm  8      Vitals      Date and Time Temp Pulse SpO2 Resp BP Pain Score FACES Pain Rating User   10/24/24 1153 -- -- -- -- -- 8 -- EC   10/24/24 1003 97.7 °F (36.5 °C) 66 100 % 17 181/73 -- -- KATHYA            Physical Exam  Vitals and nursing note reviewed.   Constitutional:       General: She is not in acute distress.     Appearance: Normal appearance. She is well-developed. She is not ill-appearing, toxic-appearing or diaphoretic.   HENT:      Head: Normocephalic and atraumatic.   Eyes:      Conjunctiva/sclera: Conjunctivae normal.   Cardiovascular:      Rate and Rhythm: Normal rate and regular rhythm.      Heart sounds: No murmur heard.  Pulmonary:      Effort: Pulmonary effort is normal. No respiratory distress.      Breath sounds: Normal breath sounds.   Abdominal:      Palpations: Abdomen is soft.      Tenderness: There is no abdominal tenderness.   Musculoskeletal:         General: No swelling.      Left shoulder: No swelling, deformity or tenderness. Normal range of motion.      Left upper arm: No swelling, deformity or tenderness.      Left elbow: No swelling or deformity. Normal range of motion. No tenderness.      Left forearm: No swelling, deformity, lacerations or tenderness.      Left wrist: Tenderness present. No swelling, deformity  or snuff box tenderness. Normal range of motion. Normal pulse.      Left hand: No swelling or deformity. Normal capillary refill. Normal pulse.      Cervical back: Normal range of motion and neck supple.      Comments: Patient does have tenderness over dorsal aspect of left wrist radiates into the base of dorsal aspect of her hand.  No signs of swelling on exam, no signs of erythema or open wounds.  Patient is able to flex and extend the elbow, wrist without difficulty.  She is able to touch first and fifth fingers.  Able to cross second and third fingers.   strength intact in the left hand.  Normal range of motion in all digits of left hand.  Proximal and distal sensation intact in the left upper extremity with normal cap refill in all digits.  Radial ulnar pulses 2+.   Skin:     General: Skin is warm and dry.      Capillary Refill: Capillary refill takes less than 2 seconds.   Neurological:      General: No focal deficit present.      Mental Status: She is alert and oriented to person, place, and time.   Psychiatric:         Mood and Affect: Mood normal.         Results Reviewed       None            XR hand 3+ views LEFT   Final Interpretation by Lio Nickerson MD (10/24 1140)      No acute osseous abnormality.      First carpometacarpal osteoarthritis.         Computerized Assisted Algorithm (CAA) may have been used to analyze all applicable images.         Workstation performed: [id number]         XR wrist 3+ views LEFT   Final Interpretation by Lio Nickerson MD (10/24 1470)      No acute osseous abnormality.      First carpometacarpal osteoarthritis.         Computerized Assisted Algorithm (CAA) may have been used to analyze all applicable images.            Workstation performed: [id number]             Procedures    ED Medication and Procedure Management   None     There are no discharge medications for this patient.    No discharge procedures on file.  ED SEPSIS DOCUMENTATION   Time reflects when diagnosis  was documented in both MDM as applicable and the Disposition within this note       Time User Action Codes Description Comment    10/24/2024 11:56 AM Jewel Marion [M79.642] Left hand pain     10/24/2024 11:56 AM Jewel Marion [M25.532] Left wrist pain                  Jewel Marion PA-C  10/24/24 8285

## 2024-10-25 ENCOUNTER — OFFICE VISIT (OUTPATIENT)
Dept: OBGYN CLINIC | Facility: CLINIC | Age: 54
End: 2024-10-25
Payer: COMMERCIAL

## 2024-10-25 VITALS — HEIGHT: 60 IN | WEIGHT: 288 LBS | BODY MASS INDEX: 56.54 KG/M2

## 2024-10-25 DIAGNOSIS — M77.8 EXTENSOR POLLICIS LONGUS TENDINITIS: Primary | ICD-10-CM

## 2024-10-25 DIAGNOSIS — M77.8 LEFT WRIST TENDONITIS: ICD-10-CM

## 2024-10-25 PROCEDURE — 20605 DRAIN/INJ JOINT/BURSA W/O US: CPT | Performed by: ORTHOPAEDIC SURGERY

## 2024-10-25 PROCEDURE — 99213 OFFICE O/P EST LOW 20 MIN: CPT | Performed by: ORTHOPAEDIC SURGERY

## 2024-10-25 RX ORDER — LIDOCAINE HYDROCHLORIDE 10 MG/ML
1 INJECTION, SOLUTION INFILTRATION; PERINEURAL
Status: COMPLETED | OUTPATIENT
Start: 2024-10-25 | End: 2024-10-25

## 2024-10-25 RX ORDER — BETAMETHASONE SODIUM PHOSPHATE AND BETAMETHASONE ACETATE 3; 3 MG/ML; MG/ML
6 INJECTION, SUSPENSION INTRA-ARTICULAR; INTRALESIONAL; INTRAMUSCULAR; SOFT TISSUE
Status: COMPLETED | OUTPATIENT
Start: 2024-10-25 | End: 2024-10-25

## 2024-10-25 RX ADMIN — BETAMETHASONE SODIUM PHOSPHATE AND BETAMETHASONE ACETATE 6 MG: 3; 3 INJECTION, SUSPENSION INTRA-ARTICULAR; INTRALESIONAL; INTRAMUSCULAR; SOFT TISSUE at 15:15

## 2024-10-25 RX ADMIN — LIDOCAINE HYDROCHLORIDE 1 ML: 10 INJECTION, SOLUTION INFILTRATION; PERINEURAL at 15:15

## 2024-10-25 NOTE — PROGRESS NOTES
The HAND & UPPER EXTREMITY OFFICE VISIT   Referred By:  No referring provider defined for this encounter.      Chief Complaint:     Left wrist pain    History of Present Illness:   54 y.o., Right hand dominant female presents with Left wrist pain    Patient states that she has been having Left radial hand and wrist pain for about 3 days now. She does not recall any injuries or traumas but does states she has a stiff lock on her door. She feels turning the key to get in her door could have caused this. Pain is noted from her 2nd MCP to her dorsal radial wrist and around the base of her thumb through the web space to the 2nd MCP. She denies numbness. There is some shooting pain at times then it will subside  She is having pain with simple functional activities like dressing her self and even steering her car. She has pain with trying to place her hand flat on a table as well.   She has been using OTC pain medications but did not take any today.    Left De Quervain's release 12 years ago  Exploratory surgery ligament rupture repair 12-15 years ago. She cannot remember exactly    ADLs: Community ambulator  Smoke: denies ETOH: denies   Drugs:  denies Job: employed       Past Medical History:  Past Medical History:   Diagnosis Date    Obesity      Past Surgical History:   Procedure Laterality Date    BREAST BIOPSY Left 08/29/2019    benign    BREAST CYST ASPIRATION Left 08/29/2019    benign    DE QUERVAIN'S RELEASE      GALLBLADDER SURGERY      US BREAST CYST ASPIRATION LEFT INITIAL Left 8/29/2019     Family History   Problem Relation Age of Onset    Diabetes Mother     Kidney failure Mother     Diabetes Father     No Known Problems Sister     No Known Problems Daughter     No Known Problems Maternal Grandmother     No Known Problems Maternal Grandfather     No Known Problems Paternal Grandmother     No Known Problems Paternal Grandfather     Hypertension Sister     Hypothyroidism Sister     Breast cancer Half-Sister 49     Colon cancer Half-Sister 57    Breast cancer additional onset Half-Sister         total 4xs breast cancer    Endometrial cancer Half-Sister 54    Kidney failure Half-Sister     No Known Problems Maternal Aunt     No Known Problems Maternal Aunt     No Known Problems Paternal Aunt     No Known Problems Paternal Aunt     Substance Abuse Half-Brother     No Known Problems Half-Brother      Social History     Socioeconomic History    Marital status: /Civil Union     Spouse name: Not on file    Number of children: Not on file    Years of education: Not on file    Highest education level: Not on file   Occupational History    Not on file   Tobacco Use    Smoking status: Never    Smokeless tobacco: Never   Vaping Use    Vaping status: Never Used   Substance and Sexual Activity    Alcohol use: Never    Drug use: Never    Sexual activity: Yes     Partners: Male     Birth control/protection: None   Other Topics Concern    Not on file   Social History Narrative    Not on file     Social Determinants of Health     Financial Resource Strain: Not on file   Food Insecurity: Not on file   Transportation Needs: Not on file   Physical Activity: Not on file   Stress: Not on file   Social Connections: Not on file   Intimate Partner Violence: Not on file   Housing Stability: Not on file     Scheduled Meds:  Continuous Infusions:No current facility-administered medications for this visit.    PRN Meds:.  No Known Allergies        Physical Examination:    Ht 5' (1.524 m)   Wt 131 kg (288 lb)   BMI 56.25 kg/m²     Gen: A&Ox3, NAD  Cardiac: regular rate  Chest: non labored breathing  Abdomen: Non-distended        Right Upper Extremity:  Skin CDI  No obvious deformity of the shoulder, arm, elbow, forearm, wrist, hand  Composite fist  Sensation intact to light touch in the axillary median, ulnar, and radial nerve distributions  motor intact  2+RP      Left Upper Extremity:  Skin CDI  No obvious deformity of the shoulder, arm,  elbow, forearm, wrist, hand  Loose composite fist due to pain  TTP at EPL tendon and 2nd dorsal compartment.   Pain with resisted wrist extension   No pain with resisted thumb extension  Sensation intact to light touch in the axillary median, ulnar, and radial nerve distributions  motor intact  2+RP      Studies:  Radiographs: I personally reviewed and independently interpreted the available radiographs.  10/24/2024: Radiographs of the Left hand, multiple views, demonstrate no acute fractures or dislocations. Severe degenerative changes noted at 1st CMC      Assessment and Plan:  1. Extensor pollicis longus tendinitis  Hand/upper extremity injection: L wrist      2. Left wrist tendonitis            54 y.o. female presents with signs and symptoms consistent with the above diagnosis.  We discussed the natural history of this condition and its pathogenesis.  We discussed operative and nonoperative treatment options.    We discussed her XR show some CMC arthritis but no fractures  Her symptoms appear to be EPL tendonitis and 2nd dorsal compartment tendonitis.     I recommended splinting for several weeks but she states this does not help as she has tried splinting in the past for other issues and she feels this made her symptoms for her other issues worse  We also discussed oral/topical medications, physical therapy, corticosteroid injections. She states she does not like to use pills or topical medications and did not find therapy helpful in the pass.     She elected to proceed with a CSI today. Risks, benefits and alternative treatments were discussed with the patient. The risks of injection include but are not limited to: bleeding, infection, damage to nerves, vessels or tendons, allergic reaction to agents, possible increase in pain, tendon or ligament rupture, weakening of bone or soft tissues, and/or elevation in blood sugar. Patient understands and would like to proceed with the proposed procedures. Injections  were tolerated well. Please see procedure note for details. May take NSAIDs/Tylenol or apply ice to the area as needed for post-injection discomfort.     It is recommended they Return if symptoms worsen or fail to improve.  she expressed understanding of the plan and agreed. We encouraged them to contact our office with any questions or concerns.     Hand/upper extremity injection: L extensor compartment 2  Universal Protocol:  procedure performed by consultantConsent: Verbal consent obtained.  Risks and benefits: risks, benefits and alternatives were discussed  Consent given by: patient  Patient understanding: patient states understanding of the procedure being performed  Site marked: the operative site was marked  Patient identity confirmed: verbally with patient  Supporting Documentation  Indications: pain and tendon swelling   Procedure Details  Condition:intersection syndrome Condition comment: EPL   Site: L extensor compartment 2   Needle size: 25 G  Ultrasound guidance: no  Approach: radial  Medications administered: 6 mg betamethasone acetate-betamethasone sodium phosphate 6 (3-3) mg/mL; 1 mL lidocaine 1 %  Patient tolerance: patient tolerated the procedure well with no immediate complications  Dressing:  Sterile dressing applied           Low Nelson MD  Hand and Upper Extremity Surgery        *This note was dictated using Dragon voice recognition software. Please excuse any word substitutions or errors.*

## 2025-03-05 ENCOUNTER — OFFICE VISIT (OUTPATIENT)
Dept: OBGYN CLINIC | Facility: MEDICAL CENTER | Age: 55
End: 2025-03-05
Payer: COMMERCIAL

## 2025-03-05 VITALS — BODY MASS INDEX: 57.52 KG/M2 | HEIGHT: 60 IN | WEIGHT: 293 LBS

## 2025-03-05 DIAGNOSIS — M77.8 EXTENSOR TENDINITIS OF HAND: Primary | ICD-10-CM

## 2025-03-05 PROCEDURE — 20550 NJX 1 TENDON SHEATH/LIGAMENT: CPT | Performed by: ORTHOPAEDIC SURGERY

## 2025-03-05 PROCEDURE — 99213 OFFICE O/P EST LOW 20 MIN: CPT | Performed by: ORTHOPAEDIC SURGERY

## 2025-03-05 RX ORDER — LIDOCAINE HYDROCHLORIDE 10 MG/ML
1 INJECTION, SOLUTION INFILTRATION; PERINEURAL
Status: COMPLETED | OUTPATIENT
Start: 2025-03-05 | End: 2025-03-05

## 2025-03-05 RX ORDER — BETAMETHASONE SODIUM PHOSPHATE AND BETAMETHASONE ACETATE 3; 3 MG/ML; MG/ML
6 INJECTION, SUSPENSION INTRA-ARTICULAR; INTRALESIONAL; INTRAMUSCULAR; SOFT TISSUE
Status: COMPLETED | OUTPATIENT
Start: 2025-03-05 | End: 2025-03-05

## 2025-03-05 RX ADMIN — LIDOCAINE HYDROCHLORIDE 1 ML: 10 INJECTION, SOLUTION INFILTRATION; PERINEURAL at 13:15

## 2025-03-05 RX ADMIN — BETAMETHASONE SODIUM PHOSPHATE AND BETAMETHASONE ACETATE 6 MG: 3; 3 INJECTION, SUSPENSION INTRA-ARTICULAR; INTRALESIONAL; INTRAMUSCULAR; SOFT TISSUE at 13:15

## 2025-03-05 NOTE — PROGRESS NOTES
HAND & UPPER EXTREMITY OFFICE VISIT   Referred By:  Referral Self  No address on file      Chief Complaint:     Right wrist pain    Previous History:   Previously seen on 10/25/24. At that point she received CSI at the left 2nd extensor compartment.     Interval History:  Patient presents today for a new complaint of right wrist pain. She reports her symptoms began yesterday and gradually progressed throughout the day. She denies any injury or trauma. She is unable to move the wrist due to the pain and has difficulty working.     She reports the left wrist is well-controlled at this point and the previous CSI provided good relief of her symptoms.     Past Medical History:  Past Medical History:   Diagnosis Date    Obesity      Past Surgical History:   Procedure Laterality Date    BREAST BIOPSY Left 08/29/2019    benign    BREAST CYST ASPIRATION Left 08/29/2019    benign    DE QUERVAIN'S RELEASE      GALLBLADDER SURGERY      US BREAST CYST ASPIRATION LEFT INITIAL Left 8/29/2019     Family History   Problem Relation Age of Onset    Diabetes Mother     Kidney failure Mother     Diabetes Father     No Known Problems Sister     No Known Problems Daughter     No Known Problems Maternal Grandmother     No Known Problems Maternal Grandfather     No Known Problems Paternal Grandmother     No Known Problems Paternal Grandfather     Hypertension Sister     Hypothyroidism Sister     Breast cancer Half-Sister 49    Colon cancer Half-Sister 57    Breast cancer additional onset Half-Sister         total 4xs breast cancer    Endometrial cancer Half-Sister 54    Kidney failure Half-Sister     No Known Problems Maternal Aunt     No Known Problems Maternal Aunt     No Known Problems Paternal Aunt     No Known Problems Paternal Aunt     Substance Abuse Half-Brother     No Known Problems Half-Brother      Social History     Socioeconomic History    Marital status: /Civil Union     Spouse name: Not on file    Number of children:  Not on file    Years of education: Not on file    Highest education level: Not on file   Occupational History    Not on file   Tobacco Use    Smoking status: Never    Smokeless tobacco: Never   Vaping Use    Vaping status: Never Used   Substance and Sexual Activity    Alcohol use: Never    Drug use: Never    Sexual activity: Yes     Partners: Male     Birth control/protection: None   Other Topics Concern    Not on file   Social History Narrative    Not on file     Social Drivers of Health     Financial Resource Strain: Not on file   Food Insecurity: Not on file   Transportation Needs: Not on file   Physical Activity: Not on file   Stress: Not on file   Social Connections: Not on file   Intimate Partner Violence: Not on file   Housing Stability: Not on file     Scheduled Meds:  Continuous Infusions:No current facility-administered medications for this visit.    PRN Meds:.  No Known Allergies    Physical Examination:    Ht 5' (1.524 m)   Wt 133 kg (294 lb)   BMI 57.42 kg/m²     Gen: A&Ox3, NAD  Cardiac: regular rate  Chest: non labored breathing  Abdomen: Non-distended      Right Upper Extremity:  Skin CDI  No obvious deformity of the shoulder, arm, elbow, forearm, wrist, hand  Positive swelling over dorsal wrist  Exquisitely TTP over EDC/4th dorsal compartment  Mild pain with extension of the digits  Non-tender first dorsal compartment, thumb CMC, dorsal radiocarpal joint, ulnocarpal joint  Sensation intact to light touch in the axillary median, ulnar, and radial nerve distributions  Unable to actively flex or extend wrist due to pain  Able to form loose composite fist and fully extend all digits with some discomfort  Warm, well-perfused digits  Cap refill <2s      Studies:  No new imaging to review      Assessment & Plan  Extensor tendinitis of hand  Previously tolerated CSI on the left hand well. We discussed treatment options including bracing, oral tylenol/NSAIDs as needed, or CSI in the office today. She  reports oral medications do not provide relief of her symptoms. She would like to try CSI in the office today. Risks, benefits and alternative treatments were discussed with the patient. These risks of injection include but are not limited to: bleeding, infection, allergic reaction to agents, possible increase in pain, and/or elevation in blood sugar. Patient understands and would like to proceed with the proposed procedure. Patient tolerated the procedure well without any complications. See procedure note for details. she may take NSAIDs/Tylenol or apply ice to the area as needed for post-injection discomfort. She was also provided with a removable wrist brace for soft tissue rest.   Orders:    Cock Up Wrist Splint    Hand/upper extremity injection    It is recommended she return to the office as needed if the problem fails to improve, worsens, or recurs.    she expressed understanding of the plan and agreed. We encouraged them to contact our office with any questions or concerns.       Low Nelson MD  Hand and Upper Extremity Surgery      *This note was dictated using Dragon voice recognition software. Please excuse any word substitutions or errors.*     Hand/upper extremity injection: R wrist  Universal Protocol:  Consent: Verbal consent obtained.  Risks and benefits: risks, benefits and alternatives were discussed  Consent given by: patient  Patient understanding: patient states understanding of the procedure being performed  Patient consent: the patient's understanding of the procedure matches consent given  Site marked: the operative site was marked  Required items: required blood products, implants, devices, and special equipment available  Patient identity confirmed: verbally with patient  Supporting Documentation  Indications: pain   Procedure Details  Condition:tendonitis Condition comment: EDC tendonitis   Site: R wrist   Needle size: 25 G  Ultrasound guidance: no  Approach: radial  Medications  administered: 6 mg betamethasone acetate-betamethasone sodium phosphate 6 (3-3) mg/mL; 1 mL lidocaine 1 %  Patient tolerance: patient tolerated the procedure well with no immediate complications  Dressing:  Sterile dressing applied             Scribe Attestation      I,:  Jil Varela PA-C am acting as a scribe while in the presence of the attending physician.:       I,:  Low Nelson MD personally performed the services described in this documentation    as scribed in my presence.:

## 2025-03-05 NOTE — ASSESSMENT & PLAN NOTE
Previously tolerated CSI on the left hand well. We discussed treatment options including bracing, oral tylenol/NSAIDs as needed, or CSI in the office today. She reports oral medications do not provide relief of her symptoms. She would like to try CSI in the office today. Risks, benefits and alternative treatments were discussed with the patient. These risks of injection include but are not limited to: bleeding, infection, allergic reaction to agents, possible increase in pain, and/or elevation in blood sugar. Patient understands and would like to proceed with the proposed procedure. Patient tolerated the procedure well without any complications. See procedure note for details. she may take NSAIDs/Tylenol or apply ice to the area as needed for post-injection discomfort. She was also provided with a removable wrist brace for soft tissue rest.   Orders:    Cock Up Wrist Splint    Hand/upper extremity injection

## 2025-03-19 ENCOUNTER — OFFICE VISIT (OUTPATIENT)
Dept: FAMILY MEDICINE CLINIC | Facility: CLINIC | Age: 55
End: 2025-03-19
Payer: COMMERCIAL

## 2025-03-19 VITALS
BODY MASS INDEX: 53.6 KG/M2 | HEIGHT: 60 IN | SYSTOLIC BLOOD PRESSURE: 124 MMHG | TEMPERATURE: 96.9 F | RESPIRATION RATE: 16 BRPM | WEIGHT: 273 LBS | DIASTOLIC BLOOD PRESSURE: 80 MMHG | OXYGEN SATURATION: 98 % | HEART RATE: 60 BPM

## 2025-03-19 DIAGNOSIS — K21.9 GASTROESOPHAGEAL REFLUX DISEASE, UNSPECIFIED WHETHER ESOPHAGITIS PRESENT: ICD-10-CM

## 2025-03-19 DIAGNOSIS — Z00.00 ANNUAL PHYSICAL EXAM: Primary | ICD-10-CM

## 2025-03-19 DIAGNOSIS — M77.50 BONE SPUR OF FOOT: ICD-10-CM

## 2025-03-19 DIAGNOSIS — M79.671 PAIN IN BOTH FEET: ICD-10-CM

## 2025-03-19 DIAGNOSIS — M79.672 PAIN IN BOTH FEET: ICD-10-CM

## 2025-03-19 DIAGNOSIS — Z83.49 FAMILY HISTORY OF HYPOTHYROIDISM: ICD-10-CM

## 2025-03-19 DIAGNOSIS — Z12.11 SCREENING FOR COLON CANCER: ICD-10-CM

## 2025-03-19 DIAGNOSIS — Z83.3 FAMILY HISTORY OF DIABETES MELLITUS (DM): ICD-10-CM

## 2025-03-19 DIAGNOSIS — E55.9 VITAMIN D DEFICIENCY: ICD-10-CM

## 2025-03-19 DIAGNOSIS — N92.6 ABNORMAL MENSTRUATION: ICD-10-CM

## 2025-03-19 DIAGNOSIS — E28.2 PCOS (POLYCYSTIC OVARIAN SYNDROME): ICD-10-CM

## 2025-03-19 DIAGNOSIS — E66.01 MORBID OBESITY WITH BODY MASS INDEX (BMI) GREATER THAN OR EQUAL TO 50 (HCC): ICD-10-CM

## 2025-03-19 DIAGNOSIS — Z12.31 ENCOUNTER FOR SCREENING MAMMOGRAM FOR BREAST CANCER: ICD-10-CM

## 2025-03-19 DIAGNOSIS — R79.82 CRP ELEVATED: ICD-10-CM

## 2025-03-19 PROCEDURE — 99386 PREV VISIT NEW AGE 40-64: CPT

## 2025-03-19 NOTE — PROGRESS NOTES
Adult Annual Physical  Name: Mae Coleman      : 1970      MRN: [id number]  Encounter Provider: ENRIQUE Garcia  Encounter Date: 3/19/2025   Encounter department: [facility]    Assessment & Plan  Annual physical exam  Labs ordered, will follow-up with patient with results.  Orders:  •  CBC and differential  •  Comprehensive metabolic panel; Future  •  Lipid panel; Future    Vitamin D deficiency  Labs ordered.  Orders:  •  Vitamin D 25 hydroxy; Future    Morbid obesity with body mass index (BMI) greater than or equal to 50 (HCC)  Update labs. Patient interested in a more conservative approach and less focus on medications like GLP-1's.  Did discuss this potential use of metformin and given patient's goal for weight loss, lower A1c secondary to family history and for PCOS history.  Orders:  •  Ambulatory Referral to Weight Management; Future  •  Lipid panel; Future    Encounter for screening mammogram for breast cancer  Follow-up with results  Orders:  •  Mammo screening bilateral w 3d and cad; Future    Family history of hypothyroidism  Update labs.  Orders:  •  TSH, 3rd generation with Free T4 reflex; Future    Family history of diabetes mellitus (DM)  Update labs  Orders:  •  Hemoglobin A1C; Future    Screening for colon cancer  Patient would like to pursue colonoscopy rather than Cologuard.  Pros and cons discussed for both.  Orders:  •  Ambulatory Referral to Gastroenterology; Future    PCOS (polycystic ovarian syndrome)  Follow-up with OB/GYN specialist in the setting of daily spotting versus hormonal fluctuations.  Overdue for cervical cancer screening.  Orders:  •  Ambulatory Referral to Obstetrics / Gynecology; Future    Abnormal menstruation  Orders:  •  Ambulatory Referral to Obstetrics / Gynecology; Future    CRP elevated  Update lab result  Orders:  •  C-reactive protein; Future    Bone spur of foot  Continue with orthotics in shoes.  Podiatry referral  placed.  Orders:  •  Ambulatory Referral to Podiatry; Future    Pain in both feet  Orders:  •  Ambulatory Referral to Podiatry; Future    Gastroesophageal reflux disease, unspecified whether esophagitis present  GI referral placed.  Symptoms could be from elevated BMI and body habitus potentially causing hiatal hernia and exposing contents into esophagus.  Patient has effective treatment with her baking soda water mixture.  Continue to monitor avoid dietary triggers.  Orders:  •  Ambulatory Referral to Gastroenterology; Future    Preventive Screenings:  - Diabetes Screening: orders placed  - Cholesterol Screening: orders placed   - Hepatitis C screening: patient declines   - HIV screening: patient declines   - Cervical cancer screening: orders placed   - Breast cancer screening: orders placed   - Colon cancer screening: orders placed   - Lung cancer screening: screening not indicated     Immunizations:  - Immunizations due: Influenza and Zoster (Shingrix)    Counseling/Anticipatory Guidance:    - Dental health: discussed importance of regular tooth brushing, flossing, and dental visits.   - Diet: discussed recommendations for a healthy/well-balanced diet.   - Exercise: the importance of regular exercise/physical activity was discussed. Recommend exercise 3-5 times per week for at least 30 minutes.   - Injury prevention: discussed safety/seat belts, safety helmets, smoke detectors, carbon monoxide detectors, and smoking near bedding or upholstery.          History of Present Illness   {?Quick Links Encounters * My Last Note * Last Note in Specialty * Snapshot * Since Last Visit * History :[id number]}  Adult Annual Physical:  Patient presents for annual physical. Mae is here for annual exam. She is currently having B/L feet pain secondary to Plantar fascitis and bone spurs causing increased pain w/ ambulation, also c/o joint inflammation, Hx. Of Recent cortisone injection B/L wrists for freezing joints/stiffness.   Not  "perimenopausal but wears pad daily for daily spotting and mixed urinary incontinence, light menses of light pink for the last 3-4 years, w/ normal periods monthly, lasting 7-8 days. Hx. PCOS, no recent PAP, HPV results on file. Is having Daily GERD s/s even w/ water, diet alterations do not make difference in s/s. She has effective Rx. W/ red mill baking soda mixed w/ H2O to reduce symptoms..     Diet and Physical Activity:  - Diet/Nutrition: consuming 3-5 servings of fruits/vegetables daily, low fat diet and adequate fiber intake. [employer]  - Exercise: walking. decreased amt. of exercise secondary to B/L feet pain    Depression Screening:  - PHQ-2 Score: 0    General Health:  - Sleep: 4-6 hours of sleep on average. \"Very light\" quality  - Hearing: normal hearing bilateral ears.  - Vision: no vision problems.  - Dental: regular dental visits.    /GYN Health:  - Follows with GYN: no.   - Menopause: premenopausal.   - History of STDs: no    Review of Systems   Constitutional:  Negative for appetite change, chills, fatigue and fever.   HENT: Negative.     Respiratory:  Positive for shortness of breath (on exertion, \"holds breath while walking\"). Negative for cough, chest tightness and wheezing.    Cardiovascular:  Negative for chest pain, palpitations and leg swelling.   Gastrointestinal:  Positive for nausea (after meals, goes away on own). Negative for abdominal pain, constipation, diarrhea and vomiting.   Genitourinary:  Negative for dysuria, flank pain, frequency and hematuria.        Mixed urinary incontinence   Musculoskeletal:  Positive for arthralgias (L knee, B/L wrists, L ankle).   Skin:  Negative for rash.   Neurological:  Negative for dizziness, syncope, weakness, light-headedness, numbness and headaches.   Psychiatric/Behavioral:  Positive for sleep disturbance (\"very light sleeper\", falling/staying asleep).        Medical History Reviewed by provider this encounter:  Tobacco  Allergies  Meds  " Problems  Med Hx  Surg Hx  Fam Hx     .  Past Medical History   Past Medical History:   Diagnosis Date   • Obesity      Past Surgical History:   Procedure Laterality Date   • BREAST BIOPSY Left 08/29/2019    benign   • BREAST CYST ASPIRATION Left 08/29/2019    benign   • DE QUERVAIN'S RELEASE     • GALLBLADDER SURGERY     • US BREAST CYST ASPIRATION LEFT INITIAL Left 8/29/2019     Family History   Problem Relation Age of Onset   • Hypertension Mother    • Stroke Mother    • Diabetes Mother    • Kidney failure Mother    • Diabetes Father    • No Known Problems Sister    • Hypertension Sister    • Hypothyroidism Sister    • No Known Problems Daughter    • No Known Problems Maternal Grandmother    • No Known Problems Maternal Grandfather    • No Known Problems Paternal Grandmother    • No Known Problems Paternal Grandfather    • No Known Problems Maternal Aunt    • No Known Problems Maternal Aunt    • No Known Problems Paternal Aunt    • No Known Problems Paternal Aunt    • Breast cancer Half-Sister 49   • Colon cancer Half-Sister 57   • Breast cancer additional onset Half-Sister         total 4xs breast cancer   • Endometrial cancer Half-Sister 54   • Kidney failure Half-Sister    • Substance Abuse Half-Brother    • No Known Problems Half-Brother       reports that she has never smoked. She has never used smokeless tobacco. She reports that she does not drink alcohol and does not use drugs.  No current outpatient medicationsNo Known Allergies   No current outpatient medications on file prior to visit.     No current facility-administered medications on file prior to visit.      Social History     Tobacco Use   • Smoking status: Never   • Smokeless tobacco: Never   Vaping Use   • Vaping status: Never Used   Substance and Sexual Activity   • Alcohol use: Never   • Drug use: Never   • Sexual activity: Yes     Partners: Male     Birth control/protection: None       Objective {?Quick Links Trend Vitals * Enter New  Vitals * Results Review * Timeline (Adult) * Labs * Imaging * Cardiology * Procedures * Lung Cancer Screening * Surgical eConsent :44933}  /80   Pulse 60   Temp (!) 96.9 °F (36.1 °C) (Temporal)   Resp 16   Ht 5' (1.524 m)   Wt 124 kg (273 lb)   SpO2 98%   BMI 53.32 kg/m²     Physical Exam  Vitals and nursing note reviewed.   Constitutional:       General: She is awake. She is not in acute distress.     Appearance: Normal appearance. She is well-developed and well-groomed. She is not ill-appearing.   HENT:      Head: Normocephalic and atraumatic.      Right Ear: External ear normal.      Left Ear: External ear normal.      Nose: Nose normal. No congestion or rhinorrhea.      Mouth/Throat:      Mouth: Mucous membranes are moist.      Pharynx: Oropharynx is clear.   Eyes:      Extraocular Movements: Extraocular movements intact.      Conjunctiva/sclera: Conjunctivae normal.      Pupils: Pupils are equal, round, and reactive to light.   Cardiovascular:      Rate and Rhythm: Normal rate and regular rhythm.      Pulses: Normal pulses.           Radial pulses are 2+ on the right side and 2+ on the left side.        Dorsalis pedis pulses are 2+ on the right side and 2+ on the left side.      Heart sounds: S1 normal and S2 normal. No murmur heard.     No friction rub. No gallop.   Pulmonary:      Effort: Pulmonary effort is normal. No respiratory distress.      Breath sounds: Normal breath sounds. No wheezing or rhonchi.   Abdominal:      General: Bowel sounds are normal.      Palpations: Abdomen is soft.      Tenderness: There is no abdominal tenderness. There is no guarding.   Musculoskeletal:         General: No swelling. Normal range of motion.      Cervical back: Normal range of motion and neck supple.      Right lower leg: No edema.      Left lower leg: No edema.   Lymphadenopathy:      Cervical: No cervical adenopathy.   Skin:     General: Skin is warm and dry.      Findings: No lesion or rash.    Neurological:      General: No focal deficit present.      Mental Status: She is alert and oriented to person, place, and time.      Cranial Nerves: Cranial nerves 2-12 are intact.      Comments: Upper/lower extremity strength 5/5 w/ sensation intact throughout    Psychiatric:         Mood and Affect: Mood normal.         Behavior: Behavior normal. Behavior is cooperative.         Thought Content: Thought content normal.       ENRIQUE Garcia   [facility] [place]   3/19/186973:29 PM

## 2025-03-19 NOTE — PATIENT INSTRUCTIONS
"Patient Education     Routine physical for adults   The Basics   Written by the doctors and editors at [facility]   What is a physical? -- A physical is a routine visit, or \"check-up,\" with your doctor. You might also hear it called a \"wellness visit\" or \"preventive visit.\"  During each visit, the doctor will:   Ask about your physical and mental health   Ask about your habits, behaviors, and lifestyle   Do an exam   Give you vaccines if needed   Talk to you about any medicines you take   Give advice about your health   Answer your questions  Getting regular check-ups is an important part of taking care of your health. It can help your doctor find and treat any problems you have. But it's also important for preventing health problems.  A routine physical is different from a \"sick visit.\" A sick visit is when you see a doctor because of a health concern or problem. Since physicals are scheduled ahead of time, you can think about what you want to ask the doctor.  How often should I get a physical? -- It depends on your age and health. In general, for people age 21 years and older:   If you are younger than 50 years, you might be able to get a physical every 3 years.   If you are 50 years or older, your doctor might recommend a physical every year.  If you have an ongoing health condition, like diabetes or high blood pressure, your doctor will probably want to see you more often.  What happens during a physical? -- In general, each visit will include:   Physical exam - The doctor or nurse will check your height, weight, heart rate, and blood pressure. They will also look at your eyes and ears. They will ask about how you are feeling and whether you have any symptoms that bother you.   Medicines - It's a good idea to bring a list of all the medicines you take to each doctor visit. Your doctor will talk to you about your medicines and answer any questions. Tell them if you are having any side effects that bother you. You " "should also tell them if you are having trouble paying for any of your medicines.   Habits and behaviors - This includes:   Your diet   Your exercise habits   Whether you smoke, drink alcohol, or use drugs   Whether you are sexually active   Whether you feel safe at home  Your doctor will talk to you about things you can do to improve your health and lower your risk of health problems. They will also offer help and support. For example, if you want to quit smoking, they can give you advice and might prescribe medicines. If you want to improve your diet or get more physical activity, they can help you with this, too.   Lab tests, if needed - The tests you get will depend on your age and situation. For example, your doctor might want to check your:   Cholesterol   Blood sugar   Iron level   Vaccines - The recommended vaccines will depend on your age, health, and what vaccines you already had. Vaccines are very important because they can prevent certain serious or deadly infections.   Discussion of screening - \"Screening\" means checking for diseases or other health problems before they cause symptoms. Your doctor can recommend screening based on your age, risk, and preferences. This might include tests to check for:   Cancer, such as breast, prostate, cervical, ovarian, colorectal, prostate, lung, or skin cancer   Sexually transmitted infections, such as chlamydia and gonorrhea   Mental health conditions like depression and anxiety  Your doctor will talk to you about the different types of screening tests. They can help you decide which screenings to have. They can also explain what the results might mean.   Answering questions - The physical is a good time to ask the doctor or nurse questions about your health. If needed, they can refer you to other doctors or specialists, too.  Adults older than 65 years often need other care, too. As you get older, your doctor will talk to you about:   How to prevent falling at " home   Hearing or vision tests   Memory testing   How to take your medicines safely   Making sure that you have the help and support you need at home  All topics are updated as new evidence becomes available and our peer review process is complete.  This topic retrieved from [employer] on: May 02, 2024.  Topic 435661 Version 1.0  Release: 32.4.3 - C32.122  © 2024 UpToDate, Inc. and/or its affiliates. All rights reserved.  Consumer Information Use and Disclaimer   Disclaimer: This generalized information is a limited summary of diagnosis, treatment, and/or medication information. It is not meant to be comprehensive and should be used as a tool to help the user understand and/or assess potential diagnostic and treatment options. It does NOT include all information about conditions, treatments, medications, side effects, or risks that may apply to a specific patient. It is not intended to be medical advice or a substitute for the medical advice, diagnosis, or treatment of a health care provider based on the health care provider's examination and assessment of a patient's specific and unique circumstances. Patients must speak with a health care provider for complete information about their health, medical questions, and treatment options, including any risks or benefits regarding use of medications. This information does not endorse any treatments or medications as safe, effective, or approved for treating a specific patient. UpToDate, Inc. and its affiliates disclaim any warranty or liability relating to this information or the use thereof.The use of this information is governed by the Terms of Use, available at https://www.woltersOscaruwer.com/en/know/clinical-effectiveness-terms. 2024© UpToDate, Inc. and its affiliates and/or licensors. All rights reserved.  Copyright   © 2024 UpToDate, Inc. and/or its affiliates. All rights reserved.

## 2025-03-19 NOTE — ASSESSMENT & PLAN NOTE
Labs ordered, will follow-up with patient with results.  Orders:  •  CBC and differential  •  Comprehensive metabolic panel; Future  •  Lipid panel; Future

## 2025-03-28 ENCOUNTER — APPOINTMENT (OUTPATIENT)
Dept: LAB | Facility: HOSPITAL | Age: 55
End: 2025-03-28
Payer: COMMERCIAL

## 2025-03-28 DIAGNOSIS — E55.9 VITAMIN D DEFICIENCY: ICD-10-CM

## 2025-03-28 DIAGNOSIS — Z83.49 FAMILY HISTORY OF HYPOTHYROIDISM: ICD-10-CM

## 2025-03-28 DIAGNOSIS — Z83.3 FAMILY HISTORY OF DIABETES MELLITUS (DM): ICD-10-CM

## 2025-03-28 DIAGNOSIS — R79.82 CRP ELEVATED: ICD-10-CM

## 2025-03-28 DIAGNOSIS — E66.01 MORBID OBESITY WITH BODY MASS INDEX (BMI) GREATER THAN OR EQUAL TO 50 (HCC): ICD-10-CM

## 2025-03-28 DIAGNOSIS — Z00.00 ANNUAL PHYSICAL EXAM: ICD-10-CM

## 2025-03-28 LAB
25(OH)D3 SERPL-MCNC: 12.9 NG/ML (ref 30–100)
ALBUMIN SERPL BCG-MCNC: 4.2 G/DL (ref 3.5–5)
ALP SERPL-CCNC: 55 U/L (ref 34–104)
ALT SERPL W P-5'-P-CCNC: 64 U/L (ref 7–52)
ANION GAP SERPL CALCULATED.3IONS-SCNC: 6 MMOL/L (ref 4–13)
AST SERPL W P-5'-P-CCNC: 33 U/L (ref 13–39)
BASOPHILS # BLD AUTO: 0.05 THOUSANDS/ÂΜL (ref 0–0.1)
BASOPHILS NFR BLD AUTO: 1 % (ref 0–1)
BILIRUB SERPL-MCNC: 0.46 MG/DL (ref 0.2–1)
BUN SERPL-MCNC: 11 MG/DL (ref 5–25)
CALCIUM SERPL-MCNC: 9.4 MG/DL (ref 8.4–10.2)
CHLORIDE SERPL-SCNC: 106 MMOL/L (ref 96–108)
CHOLEST SERPL-MCNC: 214 MG/DL (ref ?–200)
CO2 SERPL-SCNC: 29 MMOL/L (ref 21–32)
CREAT SERPL-MCNC: 0.67 MG/DL (ref 0.6–1.3)
CRP SERPL QL: 2.3 MG/L
EOSINOPHIL # BLD AUTO: 0.24 THOUSAND/ÂΜL (ref 0–0.61)
EOSINOPHIL NFR BLD AUTO: 3 % (ref 0–6)
ERYTHROCYTE [DISTWIDTH] IN BLOOD BY AUTOMATED COUNT: 13.4 % (ref 11.6–15.1)
EST. AVERAGE GLUCOSE BLD GHB EST-MCNC: 111 MG/DL
GFR SERPL CREATININE-BSD FRML MDRD: 99 ML/MIN/1.73SQ M
GLUCOSE P FAST SERPL-MCNC: 86 MG/DL (ref 65–99)
HBA1C MFR BLD: 5.5 %
HCT VFR BLD AUTO: 44.6 % (ref 34.8–46.1)
HDLC SERPL-MCNC: 67 MG/DL
HGB BLD-MCNC: 14.4 G/DL (ref 11.5–15.4)
IMM GRANULOCYTES # BLD AUTO: 0.02 THOUSAND/UL (ref 0–0.2)
IMM GRANULOCYTES NFR BLD AUTO: 0 % (ref 0–2)
LDLC SERPL CALC-MCNC: 126 MG/DL (ref 0–100)
LYMPHOCYTES # BLD AUTO: 3.47 THOUSANDS/ÂΜL (ref 0.6–4.47)
LYMPHOCYTES NFR BLD AUTO: 43 % (ref 14–44)
MCH RBC QN AUTO: 29.3 PG (ref 26.8–34.3)
MCHC RBC AUTO-ENTMCNC: 32.3 G/DL (ref 31.4–37.4)
MCV RBC AUTO: 91 FL (ref 82–98)
MONOCYTES # BLD AUTO: 0.44 THOUSAND/ÂΜL (ref 0.17–1.22)
MONOCYTES NFR BLD AUTO: 5 % (ref 4–12)
NEUTROPHILS # BLD AUTO: 3.92 THOUSANDS/ÂΜL (ref 1.85–7.62)
NEUTS SEG NFR BLD AUTO: 48 % (ref 43–75)
NONHDLC SERPL-MCNC: 147 MG/DL
NRBC BLD AUTO-RTO: 0 /100 WBCS
PLATELET # BLD AUTO: 261 THOUSANDS/UL (ref 149–390)
PMV BLD AUTO: 10.7 FL (ref 8.9–12.7)
POTASSIUM SERPL-SCNC: 4.5 MMOL/L (ref 3.5–5.3)
PROT SERPL-MCNC: 7.2 G/DL (ref 6.4–8.4)
RBC # BLD AUTO: 4.92 MILLION/UL (ref 3.81–5.12)
SODIUM SERPL-SCNC: 141 MMOL/L (ref 135–147)
TRIGL SERPL-MCNC: 103 MG/DL (ref ?–150)
TSH SERPL DL<=0.05 MIU/L-ACNC: 1.54 UIU/ML (ref 0.45–4.5)
WBC # BLD AUTO: 8.14 THOUSAND/UL (ref 4.31–10.16)

## 2025-03-28 PROCEDURE — 82306 VITAMIN D 25 HYDROXY: CPT

## 2025-03-28 PROCEDURE — 83036 HEMOGLOBIN GLYCOSYLATED A1C: CPT

## 2025-03-28 PROCEDURE — 85025 COMPLETE CBC W/AUTO DIFF WBC: CPT

## 2025-03-28 PROCEDURE — 36415 COLL VENOUS BLD VENIPUNCTURE: CPT

## 2025-03-28 PROCEDURE — 80061 LIPID PANEL: CPT

## 2025-03-28 PROCEDURE — 84443 ASSAY THYROID STIM HORMONE: CPT

## 2025-03-28 PROCEDURE — 80053 COMPREHEN METABOLIC PANEL: CPT

## 2025-03-28 PROCEDURE — 86140 C-REACTIVE PROTEIN: CPT

## 2025-03-31 ENCOUNTER — RESULTS FOLLOW-UP (OUTPATIENT)
Dept: FAMILY MEDICINE CLINIC | Facility: CLINIC | Age: 55
End: 2025-03-31

## 2025-03-31 DIAGNOSIS — R79.89 ELEVATED LFTS: Primary | ICD-10-CM

## 2025-04-25 ENCOUNTER — CONSULT (OUTPATIENT)
Dept: OBGYN CLINIC | Facility: CLINIC | Age: 55
End: 2025-04-25

## 2025-04-25 ENCOUNTER — APPOINTMENT (OUTPATIENT)
Dept: LAB | Facility: CLINIC | Age: 55
End: 2025-04-25
Payer: COMMERCIAL

## 2025-04-25 VITALS
WEIGHT: 288 LBS | HEIGHT: 60 IN | DIASTOLIC BLOOD PRESSURE: 82 MMHG | SYSTOLIC BLOOD PRESSURE: 124 MMHG | BODY MASS INDEX: 56.54 KG/M2

## 2025-04-25 DIAGNOSIS — Z12.4 ENCOUNTER FOR PAPANICOLAOU SMEAR FOR CERVICAL CANCER SCREENING: ICD-10-CM

## 2025-04-25 DIAGNOSIS — N92.4 ABNORMAL PERIMENOPAUSAL BLEEDING: ICD-10-CM

## 2025-04-25 DIAGNOSIS — Z11.51 SCREENING FOR HPV (HUMAN PAPILLOMAVIRUS): ICD-10-CM

## 2025-04-25 DIAGNOSIS — E28.2 PCOS (POLYCYSTIC OVARIAN SYNDROME): ICD-10-CM

## 2025-04-25 DIAGNOSIS — R79.89 ELEVATED LFTS: ICD-10-CM

## 2025-04-25 DIAGNOSIS — N92.6 ABNORMAL MENSTRUATION: ICD-10-CM

## 2025-04-25 DIAGNOSIS — N92.4 ABNORMAL PERIMENOPAUSAL BLEEDING: Primary | ICD-10-CM

## 2025-04-25 DIAGNOSIS — R32 URINARY INCONTINENCE, UNSPECIFIED TYPE: ICD-10-CM

## 2025-04-25 LAB
ALBUMIN SERPL BCG-MCNC: 4.1 G/DL (ref 3.5–5)
ALP SERPL-CCNC: 65 U/L (ref 34–104)
ALT SERPL W P-5'-P-CCNC: 53 U/L (ref 7–52)
AST SERPL W P-5'-P-CCNC: 27 U/L (ref 13–39)
BILIRUB DIRECT SERPL-MCNC: 0.02 MG/DL (ref 0–0.2)
BILIRUB SERPL-MCNC: 0.57 MG/DL (ref 0.2–1)
ESTRADIOL SERPL-MCNC: 30.8 PG/ML
FSH SERPL-ACNC: 28.8 MIU/ML
PROT SERPL-MCNC: 7.2 G/DL (ref 6.4–8.4)

## 2025-04-25 PROCEDURE — 83001 ASSAY OF GONADOTROPIN (FSH): CPT

## 2025-04-25 PROCEDURE — 80076 HEPATIC FUNCTION PANEL: CPT

## 2025-04-25 PROCEDURE — 36415 COLL VENOUS BLD VENIPUNCTURE: CPT

## 2025-04-25 PROCEDURE — G0476 HPV COMBO ASSAY CA SCREEN: HCPCS | Performed by: NURSE PRACTITIONER

## 2025-04-25 PROCEDURE — G0145 SCR C/V CYTO,THINLAYER,RESCR: HCPCS | Performed by: NURSE PRACTITIONER

## 2025-04-25 PROCEDURE — 82670 ASSAY OF TOTAL ESTRADIOL: CPT

## 2025-04-25 NOTE — PROGRESS NOTES
"Assessment / Plan    Assessment & Plan  Abnormal perimenopausal bleeding  Discussed nature of abnormal bleeding and necessity to rule out endometrial hyperplasia, atypia or malignancy..  Explained further evaluation with pelvic US, lab work and endometrial sampling.  Patient expressed understanding and agrees to plan.    Orders:    US pelvis complete w transvaginal; Future    Estradiol; Future    Follicle stimulating hormone; Future    Urinary incontinence, unspecified type  Referral placed for Urogynecology consult.    Orders:    Ambulatory referral to Urogynecology; Future    PCOS (polycystic ovarian syndrome)    Orders:    Ambulatory Referral to Obstetrics / Gynecology    Encounter for Papanicolaou smear for cervical cancer screening    Orders:    Liquid-based pap, screening    Screening for HPV (human papillomavirus)    Orders:    Liquid-based pap, screening    I have spent a total time of 45 minutes in caring for this patient on the day of the visit/encounter including Diagnostic results, Instructions for management, Patient and family education, Impressions, Counseling / Coordination of care, Documenting in the medical record, Reviewing/placing orders in the medical record (including tests, medications, and/or procedures), and Obtaining or reviewing history  .      Subjective      Mae Coleman is a 54 y.o. female     NEW PATIENT PROBLEM  CC: abnormal vaginal bleeding    55 yo  perimenopausal female presents with chronic vaginal bleeding of two years duration.    Patient reports that she has been having abnormal menstrual bleeding for the past 2 years.  Reports that she  \"May have a regular cycle every month\" where the flow is heavier but also has intermenstrual bleeding.  Bleeding is nearly every day, but can have up to 3-7 days without bleeding.  Denies pelvic pain/ cramping.  Denies UTI symptoms however has chronic/recurrent urinary incontinence, insensate.  Denies symptoms of anemia and recent h/h was " normal.    Last gyn evaluation ~4 yrs ago.    Pertinent history:  Hx of PCOS, since   , vaginal birth  BMI 53.3  Family history half sister with endometrial cancer, second half sister with breast and colorectal cancer  Pap hx normal  STD hx none    Pertinent labs reviewed:  3/28/2025 CBC hemoglobin 14.4 and hematocrit 44.6  TSH 1.5, CMP elevated ALT of 64   Hemoglobin A1c 5.5  Lipid panel elevated cholesterol 214, elevated   Vitamin D low 12.9    Menstrual History:  OB History          3    Para   1    Term   1            AB   2    Living             SAB   2    IAB        Ectopic        Multiple        Live Births               Obstetric Comments   Menarche 7  Was told she had PCOS after giving birth to her daughter in .  She has not yet been without bleeding.  Vaginal birth                No LMP recorded (lmp unknown).       The following portions of the patient's history were reviewed and updated as appropriate: allergies, current medications, past family history, past medical history, past social history, past surgical history, and problem list.    Review of Systems      Review of Systems   Constitutional:  Positive for unexpected weight change. Negative for chills, fatigue and fever.   Respiratory:  Negative for shortness of breath.    Cardiovascular:  Negative for chest pain and palpitations.   Gastrointestinal:  Positive for abdominal distention (bloating) and constipation. Negative for abdominal pain, diarrhea, nausea and vomiting.   Endocrine: Positive for heat intolerance (some night sweats, not severe).   Genitourinary:  Positive for menstrual problem and vaginal bleeding. Negative for difficulty urinating, dysuria, frequency, genital sores, pelvic pain, urgency, vaginal discharge and vaginal pain.        Urinary incontinence, insensate/ w/ normal movement   Neurological:  Negative for dizziness, syncope, light-headedness and headaches.   Psychiatric/Behavioral:  Positive  for sleep disturbance.        Objective     Visit Vitals  /82 (BP Location: Left arm, Patient Position: Sitting, Cuff Size: Large)   Ht 5' (1.524 m)   Wt 131 kg (288 lb)   LMP  (LMP Unknown)   BMI 56.25 kg/m²   OB Status Unknown   Smoking Status Former   BSA 2.18 m²      *patient agrees to exam without a chaperone present.     /82 (BP Location: Left arm, Patient Position: Sitting, Cuff Size: Large)   Ht 5' (1.524 m)   Wt 131 kg (288 lb)   LMP  (LMP Unknown)   BMI 56.25 kg/m²   Physical Exam  Constitutional:       General: She is not in acute distress.     Appearance: Normal appearance. She is well-developed. She is not ill-appearing or diaphoretic.      Comments: Body mass index is 56.25 kg/m².     HENT:      Head: Normocephalic and atraumatic.   Eyes:      Pupils: Pupils are equal, round, and reactive to light.   Pulmonary:      Effort: Pulmonary effort is normal.   Abdominal:      General: Abdomen is flat.      Palpations: Abdomen is soft.   Genitourinary:     General: Normal vulva.      Exam position: Lithotomy position.      Labia:         Right: No rash, tenderness, lesion or injury.         Left: No rash, tenderness, lesion or injury.       Vagina: No signs of injury and foreign body. No vaginal discharge, erythema, tenderness or bleeding.      Cervix: No cervical motion tenderness, discharge or friability.      Uterus: Not enlarged and not tender.       Adnexa:         Right: No mass or tenderness.          Left: No mass or tenderness.     Skin:     General: Skin is warm and dry.   Neurological:      General: No focal deficit present.      Mental Status: She is alert and oriented to person, place, and time.   Psychiatric:         Mood and Affect: Mood normal.         Behavior: Behavior normal.         Thought Content: Thought content normal.         Judgment: Judgment normal.

## 2025-04-28 ENCOUNTER — RESULTS FOLLOW-UP (OUTPATIENT)
Dept: OBGYN CLINIC | Facility: CLINIC | Age: 55
End: 2025-04-28

## 2025-04-28 LAB
HPV HR 12 DNA CVX QL NAA+PROBE: NEGATIVE
HPV16 DNA CVX QL NAA+PROBE: NEGATIVE
HPV18 DNA CVX QL NAA+PROBE: NEGATIVE

## 2025-04-28 NOTE — RESULT ENCOUNTER NOTE
Estradiol 30.8 and FSH 28.8.  Shantel/ Postmenopausal range.  Patient has pelvic US scheduled as well as endometrial sampling with me on 5/5/25.

## 2025-04-30 LAB
LAB AP GYN PRIMARY INTERPRETATION: NORMAL
Lab: NORMAL

## 2025-05-01 ENCOUNTER — RESULTS FOLLOW-UP (OUTPATIENT)
Dept: FAMILY MEDICINE CLINIC | Facility: CLINIC | Age: 55
End: 2025-05-01

## 2025-05-02 ENCOUNTER — HOSPITAL ENCOUNTER (OUTPATIENT)
Dept: ULTRASOUND IMAGING | Facility: MEDICAL CENTER | Age: 55
Discharge: HOME | End: 2025-05-02
Attending: NURSE PRACTITIONER
Payer: COMMERCIAL

## 2025-05-02 DIAGNOSIS — N92.4 ABNORMAL PERIMENOPAUSAL BLEEDING: ICD-10-CM

## 2025-05-02 PROCEDURE — 76830 TRANSVAGINAL US NON-OB: CPT

## 2025-05-02 PROCEDURE — 76856 US EXAM PELVIC COMPLETE: CPT

## 2025-05-05 ENCOUNTER — PROCEDURE VISIT (OUTPATIENT)
Dept: OBGYN CLINIC | Facility: CLINIC | Age: 55
End: 2025-05-05

## 2025-05-05 VITALS
WEIGHT: 286 LBS | BODY MASS INDEX: 56.15 KG/M2 | DIASTOLIC BLOOD PRESSURE: 78 MMHG | SYSTOLIC BLOOD PRESSURE: 122 MMHG | HEIGHT: 60 IN

## 2025-05-05 DIAGNOSIS — N92.4 ABNORMAL PERIMENOPAUSAL BLEEDING: Primary | ICD-10-CM

## 2025-05-05 DIAGNOSIS — Z01.812 PRE-PROCEDURE LAB EXAM: ICD-10-CM

## 2025-05-05 DIAGNOSIS — N85.8 UTERINE MASS: ICD-10-CM

## 2025-05-05 LAB — SL AMB POCT URINE HCG: NORMAL

## 2025-05-05 PROCEDURE — 88305 TISSUE EXAM BY PATHOLOGIST: CPT | Performed by: PATHOLOGY

## 2025-05-05 NOTE — PROGRESS NOTES
Assessment / Plan    Assessment & Plan  Abnormal perimenopausal bleeding  Ebx collected.  Will call patient with results as soon as available.    Orders:    Tissue Exam    Uterine mass   6.0 x 3.2 x 5.4 cm complex mass, intrauterine       Pre-procedure lab exam    Orders:    POCT urine HCG    Discussed US findings with patient in detail.  Explained that there is a large intrauterine mass that may be a benign growth or a cancerous growth.  Explained need for endometrial sampling to rule out endometrial hyperplasia, atypia or malignancy and patient agreed to procedure today.  We also discussed possibility of referral to GYN oncology if the pathology shows malignancy and briefly discussed potential total hysterectomy/BSO and/or RT/CT after staging.        Subjective   PROBLEM VISIT     Mae Coleman is a 54 y.o. female .    55 yo , perimenopausal.  Seen 25 for 2 years of abnormal bleeding.  I ordered lab work and ultrasound.    25 Estradiol 30.8 and FSH 28.8.  Shantel/ Postmenopausal range.  25 pelvic US: Uterus 11.8 x 8.2 x 9.0 cm, normal contour and echotexture.  Endometrial complex 54 mm.  Cavity distended by complex cystic, solid mass measuring 6.0 x 3.2 x 5.4 cm, suspicious for endometrial neoplasia.  Ovaries normal.  3/28/25 TSH 1.5; hgbA1c 5.5. CBC hgb 14.4, hcg 44.6    BMI 56  Family history half sister with endometrial cancer, second half sister with breast and colorectal cancer.      Menstrual History:  OB History          3    Para   1    Term   1            AB   2    Living             SAB   2    IAB        Ectopic        Multiple        Live Births               Obstetric Comments   Menarche 7  Was told she had PCOS after giving birth to her daughter in .  She has not yet been without bleeding.  Vaginal birth                No LMP recorded (lmp unknown).       The following portions of the patient's history were reviewed and updated as appropriate: allergies, current  medications, past family history, past medical history, past social history, past surgical history, and problem list.    Review of Systems      Review of Systems   Constitutional:  Negative for chills and fever.   Genitourinary:  Positive for menstrual problem and vaginal bleeding.       Objective     Visit Vitals  /78 (BP Location: Left arm, Patient Position: Sitting, Cuff Size: Large)   Ht 5' (1.524 m)   Wt 130 kg (286 lb)   LMP  (LMP Unknown)   BMI 55.86 kg/m²   OB Status Unknown   Smoking Status Former   BSA 2.17 m²      Chaperone present: Lexus Woo MA       /78 (BP Location: Left arm, Patient Position: Sitting, Cuff Size: Large)   Ht 5' (1.524 m)   Wt 130 kg (286 lb)   LMP  (LMP Unknown)   BMI 55.86 kg/m²   Physical Exam  Constitutional:       General: She is not in acute distress.     Appearance: Normal appearance. She is well-developed. She is not ill-appearing or diaphoretic.      Comments: Body mass index is 55.86 kg/m².     HENT:      Head: Normocephalic and atraumatic.   Eyes:      Pupils: Pupils are equal, round, and reactive to light.   Pulmonary:      Effort: Pulmonary effort is normal.   Abdominal:      General: Abdomen is flat.      Palpations: Abdomen is soft.   Genitourinary:     General: Normal vulva.      Exam position: Lithotomy position.      Labia:         Right: No rash, tenderness, lesion or injury.         Left: No rash, tenderness, lesion or injury.       Vagina: No signs of injury and foreign body. No vaginal discharge, erythema, tenderness or bleeding.      Cervix: No cervical motion tenderness, discharge or friability.   Skin:     General: Skin is warm and dry.   Neurological:      General: No focal deficit present.      Mental Status: She is alert and oriented to person, place, and time.   Psychiatric:         Mood and Affect: Mood normal.         Behavior: Behavior normal.         Thought Content: Thought content normal.         Judgment: Judgment normal.           Endometrial biopsy    Date/Time: 5/5/2025 3:00 PM    Performed by: ENRIQUE Knowles  Authorized by: ENRIQUE Knowles  Universal Protocol:  Consent: Written consent obtained.  Risks and benefits: risks, benefits and alternatives were discussed  Consent given by: patient  Timeout called at: 5/5/2025 3:15 PM.  Patient understanding: patient states understanding of the procedure being performed  Patient consent: the patient's understanding of the procedure matches consent given  Procedure consent: procedure consent matches procedure scheduled  Site marked: the operative site was marked  Patient identity confirmed: verbally with patient    Indication:     Indications: Other disorder of menstruation and other abnormal bleeding from female genital tract    Pre-procedure:     Negative urine pregnancy test: yes    Procedure:     Procedure: endometrial biopsy with Pipelle      A bivalve speculum was placed in the vagina: yes      Cervix cleaned and prepped: yes      A paracervical block was performed: no      An intracervical block was performed: no      The cervix was dilated: no      Uterus sounded: yes      Uterus sound depth (cm):  9    Specimen collected: specimen collected and sent to pathology      Patient tolerated procedure well with no complications: yes    Comments:     Procedure comments:  Post procedure instructions reviewed.

## 2025-05-06 ENCOUNTER — TELEPHONE (OUTPATIENT)
Age: 55
End: 2025-05-06

## 2025-05-06 NOTE — TELEPHONE ENCOUNTER
Patient calling regarding EMB results from procedure done yesterday. Advised results are still pending. We will contact when results have been received and reviewed by provider

## 2025-05-07 ENCOUNTER — TELEPHONE (OUTPATIENT)
Dept: OBGYN CLINIC | Facility: CLINIC | Age: 55
End: 2025-05-07

## 2025-05-07 ENCOUNTER — RESULTS FOLLOW-UP (OUTPATIENT)
Dept: OBGYN CLINIC | Facility: CLINIC | Age: 55
End: 2025-05-07

## 2025-05-07 DIAGNOSIS — C54.1 ENDOMETRIAL CARCINOMA (HCC): Primary | ICD-10-CM

## 2025-05-07 PROCEDURE — 88305 TISSUE EXAM BY PATHOLOGIST: CPT | Performed by: PATHOLOGY

## 2025-05-07 NOTE — RESULT ENCOUNTER NOTE
Endometrial pathology suspicious for low grade endometrioid adenocarcinoma.  Patient informed and gyn onc referral placed.

## 2025-05-07 NOTE — TELEPHONE ENCOUNTER
Spoke to patient.  Informed of tissue pathology finding of endometrioid carcinoma.  As was reviewed with her at the time of her biopsy, I advised her that I will place a referral to gynecologic oncology and that they will call her to schedule an appointment.

## 2025-05-08 ENCOUNTER — DOCUMENTATION (OUTPATIENT)
Dept: HEMATOLOGY ONCOLOGY | Facility: CLINIC | Age: 55
End: 2025-05-08

## 2025-05-08 NOTE — PROGRESS NOTES
All records needed are in patients chart. No records retrieval needed at this time.    Pt referred to gyn onc and should be scheduled within 7 days.  Please notify me if not scheduled within time frame.    Referral received/ Chart reviewed for work up completed     [facility]   [] [facility]/CT   [] MRI   [] CT   [x] US   [] Mammo   [] Bone scan   [] N/A    Pathology completed:    Date:2025   Location:[facility]   []N/A    Additional records needed:    [] Genomic report   [] Genetic testing results   [] Office Note   [] Procedure/ Operative note   [] Lab results   [x] N/A      [] Radiation Oncology records retrieval needed (PN to route to rad/onc clerical pool once scheduled) N/A  Date:  Location:    Referred by: Erin Ghotra      Diagnosis: endometrial carcinoma      Imagin2025- US pelvis complete transvaginal      Pathology: 2025- Markedly fragmented specimen with atypical glandular proliferation with squamous and mucinous differentiation, suspicious for low-grade endometrioid adenocarcinoma.         Scheduled appointments:    Future Appointments   Date Time Provider Department Center   2025  9:15 AM DO [facility] [place]            Surgery: TBD        Post surgical pathology: TBD

## 2025-05-19 ENCOUNTER — CONSULT (OUTPATIENT)
Dept: GYNECOLOGIC ONCOLOGY | Facility: CLINIC | Age: 55
End: 2025-05-19
Attending: NURSE PRACTITIONER
Payer: COMMERCIAL

## 2025-05-19 VITALS
RESPIRATION RATE: 17 BRPM | BODY MASS INDEX: 48.86 KG/M2 | HEART RATE: 68 BPM | SYSTOLIC BLOOD PRESSURE: 120 MMHG | OXYGEN SATURATION: 97 % | TEMPERATURE: 97.5 F | HEIGHT: 61 IN | DIASTOLIC BLOOD PRESSURE: 86 MMHG | WEIGHT: 258.8 LBS

## 2025-05-19 DIAGNOSIS — N85.00 ENDOMETRIAL HYPERPLASIA: Primary | ICD-10-CM

## 2025-05-19 PROCEDURE — 99205 OFFICE O/P NEW HI 60 MIN: CPT | Performed by: OBSTETRICS & GYNECOLOGY

## 2025-05-19 RX ORDER — ACETAMINOPHEN 325 MG/1
975 TABLET ORAL ONCE
OUTPATIENT
Start: 2025-05-19 | End: 2025-05-19

## 2025-05-19 RX ORDER — ENOXAPARIN SODIUM 300 MG/3ML
40 INJECTION INTRAVENOUS; SUBCUTANEOUS
OUTPATIENT
Start: 2025-05-19 | End: 2025-05-20

## 2025-05-19 RX ORDER — SODIUM CHLORIDE, SODIUM LACTATE, POTASSIUM CHLORIDE, CALCIUM CHLORIDE 600; 310; 30; 20 MG/100ML; MG/100ML; MG/100ML; MG/100ML
20 INJECTION, SOLUTION INTRAVENOUS CONTINUOUS
OUTPATIENT
Start: 2025-05-19

## 2025-05-19 NOTE — PROGRESS NOTES
Name: Mae Coleman      : 1970      MRN: [id number]  Encounter Provider: Tommy Seymour MD  Encounter Date: 2025   Encounter department: [facility] [place]  :  Assessment & Plan  Endometrial hyperplasia  The patient has a new diagnosis of atypical endometrial proliferation worrisome for low-grade endometrial cancer by her most recent biopsy.  We have discussed with the patient that there may be as much as 40% likelihood of having a concurrent endometrial cancer.  We have discussed treatment options for this disease including surgery and hormonal management.  I have stated that medical evidence indicates that at this point to surgical management is her best option. I have discussed also the risks and benefits of lymph node biopsy at the time of surgery.  We have discussed open versus laparoscopic versus robotic approach and have recommended the following:    Robotically assisted total laparoscopic hysterectomy bilateral salpingo-oophorectomy with possible pelvic and periaortic lymph node biopsy.  Have discussed risks and benefits of the procedure including bleeding requiring transfusion infection, infection, damage to local structures including bowel bladder ureter and other local organs.  We discussed the risk of deep venous thrombosis.  All of these complications are in the 2-4% range of likelihood.  An open procedure may be required.  Mini laparotomy may be required given the size of the uterus.  The patient understands the risks and benefits of the procedure and has signed an informed consent.  I personally signed the consent form with her.  She does understand that further treatment including chemotherapy radiation therapy or hormones may be required based on the final postoperative pathologic diagnosis and staging.  Standard preoperative testing including type and screen is CBC CPM P chest x-ray and EKG will be ordered.    Overall consultation took 60 min with  greater than 50% in dedicated toward discussion time.     Orders:  •  Case request operating room: HYSTERECTOMY LAPAROSCOPIC TOTAL (LTH) BSO W/ ROBOTICS possible staging; Standing  •  Type and screen; Future  •  Comprehensive metabolic panel; Future  •  CBC and differential; Future  •  EKG 12 lead; Future  •  XR chest pa and lateral; Future            History of Present Illness   Reason for Visit / CC: Newly diagnosed endometrial hyperplasia versus malignancy.    Mae Coleman is a 54 y.o. female   Patient is a pleasant 54-year-old  0 who experienced perimenopausal bleeding.  The patient reports that her periods have never stopped but have become prolonged over the past year and a half to the point where she is nearly bleeding every day.  She underwent evaluation include pelvic ultrasound and Pipelle biopsy which revealed the following:  Most recent pelvic ultrasound reveals the following:  FINDINGS:     UTERUS:  The uterus is anteverted in position, measuring 11.8 x 8.2 x 9.0 cm.  The uterus has a normal contour and echotexture.  The cervix appears within normal limits.     ENDOMETRIUM:  The endometrial echo complex has an AP caliber of 54.0 mm.  Endometrial cavity is distended by complex heterogeneous cystic and solid mass. The endometrial cavity is distended up to 4.8 cm in AP diameter. The large solid hypervascular lesion within the endometrium measures approximately 6.0 x 3.2 x 5.4 cm,   suspicious for endometrial neoplasia.     OVARIES/ADNEXA:  Right ovary: 2.8 x 2.0 x 1.6 cm. 4.6 mL.  Ovarian Doppler flow is within normal limits.  No suspicious ovarian or adnexal abnormality.     Left ovary: 2.5 x 1.8 x 1.9 cm. 4.5 mL.  Ovarian Doppler flow is within normal limits.  No suspicious ovarian or adnexal abnormality.     OTHER:  No free fluid or loculated fluid collections.     IMPRESSION:     Suspicious complex cystic and solid vascular lesion distending and thickening the endometrium suspicious for  neoplasm.     Follow-up endometrial biopsy reveals the following:  Final Diagnosis  A. Endometrium:  - Markedly fragmented specimen with atypical glandular proliferation with squamous and mucinous differentiation, suspicious for low-grade endometrioid adenocarcinoma.  See comment.     Comment: Due to specimen fragmentation, MMR, p53, and ER/MA testing is deferred to an excision specimen unless clinically requested.    Today, the patient is doing well.  She denies significant abdominal pain, pelvic pain, nausea, vomiting, constipation, diarrhea, fevers, chills,            Pertinent Medical History             Review of Systems   Constitutional: Negative.    HENT: Negative.     Eyes: Negative.    Respiratory: Negative.     Cardiovascular: Negative.    Gastrointestinal: Negative.    Endocrine: Negative.    Genitourinary:  Positive for vaginal bleeding.   Musculoskeletal: Negative.    Skin: Negative.    Neurological: Negative.    Hematological: Negative.    Psychiatric/Behavioral: Negative.      A complete review of systems is negative other than that noted above in the HPI.  Past Medical History   Past Medical History:   Diagnosis Date   • Adult BMI 50.0-59.9 kg/sq m (HCC)    • Obesity    • Polycystic ovary syndrome      Past Surgical History:   Procedure Laterality Date   • BREAST BIOPSY Left 08/29/2019    benign   • BREAST CYST ASPIRATION Left 08/29/2019    benign   • DE QUERVAIN'S RELEASE     • GALLBLADDER SURGERY     • US BREAST CYST ASPIRATION LEFT INITIAL Left 8/29/2019     Family History   Problem Relation Age of Onset   • Hypertension Mother    • Stroke Mother    • Diabetes Mother    • Kidney failure Mother    • Diabetes Father    • No Known Problems Sister    • Hypertension Sister    • Hypothyroidism Sister    • Cancer Sister         anorectal cancer   • Breast cancer Sister         in 40s   • No Known Problems Daughter    • No Known Problems Maternal Grandmother    • No Known Problems Maternal Grandfather   "  • No Known Problems Paternal Grandmother    • No Known Problems Paternal Grandfather    • No Known Problems Maternal Aunt    • No Known Problems Maternal Aunt    • No Known Problems Paternal Aunt    • No Known Problems Paternal Aunt    • Breast cancer Half-Sister 49   • Colon cancer Half-Sister 57   • Breast cancer additional onset Half-Sister         total 4xs breast cancer   • Endometrial cancer Half-Sister 54   • Pancreatic cancer Half-Sister    • Kidney failure Half-Sister    • Substance Abuse Half-Brother    • No Known Problems Half-Brother    • Ovarian cancer Neg Hx    • Uterine cancer Neg Hx       reports that she has quit smoking. Her smoking use included cigarettes. She has never used smokeless tobacco. She reports that she does not drink alcohol and does not use drugs.  No current outpatient medicationsAllergies[1]      Objective   Resp 17   Ht 5' 1.24\" (1.555 m)   Wt 117 kg (258 lb 12.8 oz)   LMP  (LMP Unknown)   BMI 48.52 kg/m²     Body mass index is 48.52 kg/m².  Pain Screening:     ECOG   0  Physical Exam  Constitutional:       Appearance: She is well-developed.   HENT:      Head: Normocephalic and atraumatic.     Eyes:      Pupils: Pupils are equal, round, and reactive to light.       Cardiovascular:      Rate and Rhythm: Normal rate and regular rhythm.      Heart sounds: Normal heart sounds.   Pulmonary:      Effort: Pulmonary effort is normal. No respiratory distress.      Breath sounds: Normal breath sounds.   Abdominal:      General: Bowel sounds are normal. There is no distension.      Palpations: Abdomen is soft. Abdomen is not rigid.      Tenderness: There is no abdominal tenderness. There is no guarding or rebound.   Genitourinary:     Comments: -Normal external female genitalia, normal Bartholin's and Pembroke's glands                  -Normal midline urethral meatus. No lesions notes                  -Bladder without fullness mass or tenderness                  -Vagina without lesion or " "discharge No significant cystocele or rectocele noted                  -Cervix normal appearing without visible lesions                  -Uterus with normal contour, mobility. No tenderness,                  -Adnexae without  mass or tenderness                  - Anus without fissure of lesion      Musculoskeletal:         General: Normal range of motion.      Cervical back: Normal range of motion and neck supple.   Lymphadenopathy:      Cervical: No cervical adenopathy.      Upper Body:      Right upper body: No supraclavicular adenopathy.      Left upper body: No supraclavicular adenopathy.     Skin:     General: Skin is warm and dry.     Neurological:      Mental Status: She is alert and oriented to person, place, and time.     Psychiatric:         Behavior: Behavior normal.        Labs: I have reviewed pertinent labs.   No results found for: \"\"  Lab Results   Component Value Date/Time    Potassium 4.5 03/28/2025 01:53 PM    Chloride 106 03/28/2025 01:53 PM    CO2 29 03/28/2025 01:53 PM    BUN 11 03/28/2025 01:53 PM    Creatinine 0.67 03/28/2025 01:53 PM    Glucose, Fasting 86 03/28/2025 01:53 PM    Calcium 9.4 03/28/2025 01:53 PM    AST 27 04/25/2025 09:23 AM    ALT 53 (H) 04/25/2025 09:23 AM    Alkaline Phosphatase 65 04/25/2025 09:23 AM    eGFR 99 03/28/2025 01:53 PM     Lab Results   Component Value Date/Time    WBC 8.14 03/28/2025 01:53 PM    Hemoglobin 14.4 03/28/2025 01:53 PM    Hematocrit 44.6 03/28/2025 01:53 PM    MCV 91 03/28/2025 01:53 PM    Platelets 261 03/28/2025 01:53 PM     Lab Results   Component Value Date/Time    Absolute Neutrophils 3.92 03/28/2025 01:53 PM        Trend:  No results found for: \"\"                     [1]  No Known Allergies  "

## 2025-05-19 NOTE — ASSESSMENT & PLAN NOTE
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  We have discussed with the patient that there may be as much as 40% likelihood of having a concurrent endometrial cancer.  We have discussed treatment options for this disease including surgery and hormonal management.  I have stated that medical evidence indicates that at this point to surgical management is her best option. I have discussed also the risks and benefits of lymph node biopsy at the time of surgery.  We have discussed open versus laparoscopic versus robotic approach and have recommended the following:    Robotically assisted total laparoscopic hysterectomy bilateral salpingo-oophorectomy with possible pelvic and periaortic lymph node biopsy.  Have discussed risks and benefits of the procedure including bleeding requiring transfusion infection, infection, damage to local structures including bowel bladder ureter and other local organs.  We discussed the risk of deep venous thrombosis.  All of these complications are in the 2-4% range of likelihood.  An open procedure may be required.  Mini laparotomy may be required given the size of the uterus.  The patient understands the risks and benefits of the procedure and has signed an informed consent.  I personally signed the consent form with her.  She does understand that further treatment including chemotherapy radiation therapy or hormones may be required based on the final postoperative pathologic diagnosis and staging.  Standard preoperative testing including type and screen is CBC CPM P chest x-ray and EKG will be ordered.    Overall consultation took 60 min with greater than 50% in dedicated toward discussion time.     Orders:    Case request operating room: HYSTERECTOMY LAPAROSCOPIC TOTAL (LTH) BSO W/ ROBOTICS possible staging; Standing    Type and screen; Future    Comprehensive metabolic panel; Future    CBC  and differential; Future    EKG 12 lead; Future    XR chest pa and lateral; Future

## 2025-05-29 ENCOUNTER — TELEPHONE (OUTPATIENT)
Age: 55
End: 2025-05-29

## 2025-05-29 NOTE — TELEPHONE ENCOUNTER
Called and spoke to the patient about the FMLA being successfully re-faxed over to her employer. I stated I will be attaching another copy of it to her chart.

## 2025-05-29 NOTE — TELEPHONE ENCOUNTER
Patient calling.    Work did not receive [facility] paperwork.    Please send to [phone] to work FAX of patient.    Seeing Dr Seymour for surgery.

## 2025-06-11 ENCOUNTER — APPOINTMENT (OUTPATIENT)
Dept: LAB | Facility: HOSPITAL | Age: 55
End: 2025-06-11
Payer: COMMERCIAL

## 2025-06-11 ENCOUNTER — HOSPITAL ENCOUNTER (OUTPATIENT)
Dept: RADIOLOGY | Facility: HOSPITAL | Age: 55
Discharge: HOME | End: 2025-06-11
Payer: COMMERCIAL

## 2025-06-11 DIAGNOSIS — N85.00 ENDOMETRIAL HYPERPLASIA: ICD-10-CM

## 2025-06-11 LAB
ALBUMIN SERPL BCG-MCNC: 4 G/DL (ref 3.5–5)
ALP SERPL-CCNC: 57 U/L (ref 34–104)
ALT SERPL W P-5'-P-CCNC: 46 U/L (ref 7–52)
ANION GAP SERPL CALCULATED.3IONS-SCNC: 5 MMOL/L (ref 4–13)
AST SERPL W P-5'-P-CCNC: 24 U/L (ref 13–39)
ATRIAL RATE: 59 BPM
BASOPHILS # BLD AUTO: 0.05 THOUSANDS/ÂΜL (ref 0–0.1)
BASOPHILS NFR BLD AUTO: 1 % (ref 0–1)
BILIRUB SERPL-MCNC: 0.4 MG/DL (ref 0.2–1)
BUN SERPL-MCNC: 11 MG/DL (ref 5–25)
CALCIUM SERPL-MCNC: 9.1 MG/DL (ref 8.4–10.2)
CHLORIDE SERPL-SCNC: 106 MMOL/L (ref 96–108)
CO2 SERPL-SCNC: 29 MMOL/L (ref 21–32)
CREAT SERPL-MCNC: 0.7 MG/DL (ref 0.6–1.3)
EOSINOPHIL # BLD AUTO: 0.22 THOUSAND/ÂΜL (ref 0–0.61)
EOSINOPHIL NFR BLD AUTO: 3 % (ref 0–6)
ERYTHROCYTE [DISTWIDTH] IN BLOOD BY AUTOMATED COUNT: 13.7 % (ref 11.6–15.1)
GFR SERPL CREATININE-BSD FRML MDRD: 97 ML/MIN/1.73SQ M
GLUCOSE P FAST SERPL-MCNC: 96 MG/DL (ref 65–99)
HCT VFR BLD AUTO: 44.7 % (ref 34.8–46.1)
HGB BLD-MCNC: 14.2 G/DL (ref 11.5–15.4)
IMM GRANULOCYTES # BLD AUTO: 0.03 THOUSAND/UL (ref 0–0.2)
IMM GRANULOCYTES NFR BLD AUTO: 0 % (ref 0–2)
LYMPHOCYTES # BLD AUTO: 3.27 THOUSANDS/ÂΜL (ref 0.6–4.47)
LYMPHOCYTES NFR BLD AUTO: 45 % (ref 14–44)
MCH RBC QN AUTO: 29.5 PG (ref 26.8–34.3)
MCHC RBC AUTO-ENTMCNC: 31.8 G/DL (ref 31.4–37.4)
MCV RBC AUTO: 93 FL (ref 82–98)
MONOCYTES # BLD AUTO: 0.41 THOUSAND/ÂΜL (ref 0.17–1.22)
MONOCYTES NFR BLD AUTO: 6 % (ref 4–12)
NEUTROPHILS # BLD AUTO: 3.28 THOUSANDS/ÂΜL (ref 1.85–7.62)
NEUTS SEG NFR BLD AUTO: 45 % (ref 43–75)
NRBC BLD AUTO-RTO: 0 /100 WBCS
P AXIS: 137 DEGREES
PLATELET # BLD AUTO: 262 THOUSANDS/UL (ref 149–390)
PMV BLD AUTO: 11.1 FL (ref 8.9–12.7)
POTASSIUM SERPL-SCNC: 4.3 MMOL/L (ref 3.5–5.3)
PR INTERVAL: 164 MS
PROT SERPL-MCNC: 6.8 G/DL (ref 6.4–8.4)
QRS AXIS: 178 DEGREES
QRSD INTERVAL: 94 MS
QT INTERVAL: 444 MS
QTC INTERVAL: 440 MS
RBC # BLD AUTO: 4.82 MILLION/UL (ref 3.81–5.12)
SODIUM SERPL-SCNC: 140 MMOL/L (ref 135–147)
T WAVE AXIS: 144 DEGREES
VENTRICULAR RATE: 59 BPM
WBC # BLD AUTO: 7.26 THOUSAND/UL (ref 4.31–10.16)

## 2025-06-11 PROCEDURE — 71046 X-RAY EXAM CHEST 2 VIEWS: CPT

## 2025-06-11 PROCEDURE — 36415 COLL VENOUS BLD VENIPUNCTURE: CPT

## 2025-06-11 PROCEDURE — 80053 COMPREHEN METABOLIC PANEL: CPT

## 2025-06-11 PROCEDURE — 93010 ELECTROCARDIOGRAM REPORT: CPT

## 2025-06-11 PROCEDURE — 86901 BLOOD TYPING SEROLOGIC RH(D): CPT

## 2025-06-11 PROCEDURE — 86850 RBC ANTIBODY SCREEN: CPT

## 2025-06-11 PROCEDURE — 86900 BLOOD TYPING SEROLOGIC ABO: CPT

## 2025-06-11 PROCEDURE — 85025 COMPLETE CBC W/AUTO DIFF WBC: CPT

## 2025-06-12 LAB
ABO GROUP BLD: NORMAL
BLD GP AB SCN SERPL QL: NEGATIVE
RH BLD: NEGATIVE
SPECIMEN EXPIRATION DATE: NORMAL

## 2025-06-17 ENCOUNTER — VBI (OUTPATIENT)
Dept: ADMINISTRATIVE | Facility: OTHER | Age: 55
End: 2025-06-17

## 2025-06-17 NOTE — TELEPHONE ENCOUNTER
06/17/25 2:17 PM     Chart reviewed for CRC: Colonoscopy was/were not submitted to the patient's insurance.     Esperanza Smith MA   [facility]

## 2025-06-19 NOTE — PRE-PROCEDURE INSTRUCTIONS
No outpatient medications have been marked as taking for the 6/27/25 encounter (Hospital Encounter).    Per patient, she does not take any medications at this time.    Medication instructions for day of surgery reviewed. Please take all instructed medications with only a sip of water. Please do not take any over the counter (non-prescribed) vitamins or supplements for one week prior to date of surgery.      You will receive a call one business day prior to surgery with an arrival time and hospital directions. If your surgery is scheduled on a Monday, the hospital will be calling you on the Friday prior to your surgery. If you have not heard from anyone by 8pm, please call the hospital supervisor through the hospital  at [phone]. ([place] [phone] or [place] [phone]).    Do not eat or drink anything after midnight the night before your surgery, including candy, mints, lifesavers, or chewing gum. Do not drink alcohol 24hrs before your surgery. Try not to smoke at least 24hrs before your surgery.       Follow the pre surgery showering instructions as listed in the “My Surgical Experience Booklet” or otherwise provided by your surgeon's office. Do not use a blade to shave the surgical area 1 week before surgery. It is okay to use a clean electric clippers up to 24 hours before surgery. Do not apply any lotions, creams, including makeup, cologne, deodorant, or perfumes after showering on the day of your surgery. Do not use dry shampoo, hair spray, hair gel, or any type of hair products.     No contact lenses, eye make-up, or artificial eyelashes. Remove nail polish, including gel polish, and any artificial, gel, or acrylic nails if possible. Remove all jewelry including rings and body piercing jewelry.     Wear causal clothing that is easy to take on and off. Consider your type of surgery.    Keep any valuables, jewelry, piercings at home. Please bring any specially ordered equipment (sling,  braces) if indicated.    Arrange for a responsible person to drive you to and from the hospital on the day of your surgery. Please confirm the visitor policy for the day of your procedure when you receive your phone call with an arrival time.     Call the surgeon's office with any new illnesses, exposures, or additional questions prior to surgery.    Please reference your “My Surgical Experience Booklet” for additional information to prepare for your upcoming surgery.

## 2025-06-26 ENCOUNTER — ANESTHESIA EVENT (OUTPATIENT)
Dept: PERIOP | Facility: HOSPITAL | Age: 55
DRG: 742 | End: 2025-06-26
Payer: COMMERCIAL

## 2025-06-26 NOTE — ANESTHESIA PREPROCEDURE EVALUATION
Procedure:  HYSTERECTOMY LAPAROSCOPIC TOTAL (LTH) BSO W/ROBOTICS, EXAM UNDER ANESTHESIA, POSSIBLE STAGING (Abdomen)    Relevant Problems   Obstetrics/Gynecology   (+) Endometrial hyperplasia   (+) Polycystic ovary syndrome      Other   (+) Morbid obesity with body mass index (BMI) greater than or equal to 50 (HCC)        Physical Exam    Airway     Mallampati score: III  TM Distance: <3 FB    Mouth opening: < 4 cm      Cardiovascular  Rhythm: regular, Rate: normal    Dental   No notable dental hx     Pulmonary  Comment: distant Breath sounds clear to auscultation    Neurological    She appears awake, alert and oriented x3.      Other Findings        Anesthesia Plan  ASA Score- 3     Anesthesia Type- general with ASA Monitors.         Additional Monitors:     Airway Plan: Oral ETT.    Comment: 2nd PIV, discussed TAPS if open, confirmed transfusion OK if needed  Discussed potential need for CPAP/BiPAP or a vent wean given positoning and body habitus. .       Plan Factors-Exercise tolerance (METS): >4 METS.    Chart reviewed.   Existing labs reviewed.     Patient is not a current smoker.      Obstructive sleep apnea risk education given perioperatively.        Induction- intravenous.    Postoperative Plan- Plan for postoperative opioid use.   Monitoring Plan - Monitoring plan - standard ASA monitoring  Post Operative Pain Plan - non-opiod analgesics, plan for postoperative opioid use and multimodal analgesia        Informed Consent- Anesthetic plan and risks discussed with patient.        NPO Status:  No vitals data found for the desired time range.

## 2025-06-27 ENCOUNTER — ANESTHESIA (OUTPATIENT)
Dept: PERIOP | Facility: HOSPITAL | Age: 55
DRG: 742 | End: 2025-06-27
Payer: COMMERCIAL

## 2025-06-27 ENCOUNTER — HOSPITAL ENCOUNTER (INPATIENT)
Facility: HOSPITAL | Age: 55
Discharge: HOME | DRG: 742 | End: 2025-06-27
Attending: OBSTETRICS & GYNECOLOGY | Admitting: OBSTETRICS & GYNECOLOGY
Payer: COMMERCIAL

## 2025-06-27 DIAGNOSIS — N85.00 ENDOMETRIAL HYPERPLASIA: Primary | ICD-10-CM

## 2025-06-27 DIAGNOSIS — G89.18 POSTOPERATIVE PAIN: ICD-10-CM

## 2025-06-27 DIAGNOSIS — J38.01 PARESIS OF LEFT VOCAL CORD: ICD-10-CM

## 2025-06-27 DIAGNOSIS — G89.18 POST-OP PAIN: ICD-10-CM

## 2025-06-27 LAB
ABO GROUP BLD: NORMAL
EXT PREGNANCY TEST URINE: NEGATIVE
EXT. CONTROL: NORMAL
RH BLD: NEGATIVE

## 2025-06-27 PROCEDURE — 81025 URINE PREGNANCY TEST: CPT | Performed by: OBSTETRICS & GYNECOLOGY

## 2025-06-27 PROCEDURE — 58573 TLH W/T/O UTERUS OVER 250 G: CPT | Performed by: OBSTETRICS & GYNECOLOGY

## 2025-06-27 PROCEDURE — 88341 IMHCHEM/IMCYTCHM EA ADD ANTB: CPT | Performed by: PATHOLOGY

## 2025-06-27 PROCEDURE — 88309 TISSUE EXAM BY PATHOLOGIST: CPT | Performed by: PATHOLOGY

## 2025-06-27 PROCEDURE — 0UT74ZZ RESECTION OF BILATERAL FALLOPIAN TUBES, PERCUTANEOUS ENDOSCOPIC APPROACH: ICD-10-PCS | Performed by: OBSTETRICS & GYNECOLOGY

## 2025-06-27 PROCEDURE — S2900 ROBOTIC SURGICAL SYSTEM: HCPCS | Performed by: OBSTETRICS & GYNECOLOGY

## 2025-06-27 PROCEDURE — 88342 IMHCHEM/IMCYTCHM 1ST ANTB: CPT | Performed by: PATHOLOGY

## 2025-06-27 PROCEDURE — 0UT94ZZ RESECTION OF UTERUS, PERCUTANEOUS ENDOSCOPIC APPROACH: ICD-10-PCS | Performed by: OBSTETRICS & GYNECOLOGY

## 2025-06-27 PROCEDURE — 0UT24ZZ RESECTION OF BILATERAL OVARIES, PERCUTANEOUS ENDOSCOPIC APPROACH: ICD-10-PCS | Performed by: OBSTETRICS & GYNECOLOGY

## 2025-06-27 PROCEDURE — 8E0W4CZ ROBOTIC ASSISTED PROCEDURE OF TRUNK REGION, PERCUTANEOUS ENDOSCOPIC APPROACH: ICD-10-PCS | Performed by: OBSTETRICS & GYNECOLOGY

## 2025-06-27 PROCEDURE — NC001 PR NO CHARGE: Performed by: OBSTETRICS & GYNECOLOGY

## 2025-06-27 PROCEDURE — NC001 PR NO CHARGE: Performed by: PHYSICIAN ASSISTANT

## 2025-06-27 RX ORDER — KETOROLAC TROMETHAMINE 30 MG/ML
INJECTION, SOLUTION INTRAMUSCULAR; INTRAVENOUS AS NEEDED
Status: DISCONTINUED | OUTPATIENT
Start: 2025-06-27 | End: 2025-06-27

## 2025-06-27 RX ORDER — FENTANYL CITRATE 50 UG/ML
INJECTION, SOLUTION INTRAMUSCULAR; INTRAVENOUS AS NEEDED
Status: DISCONTINUED | OUTPATIENT
Start: 2025-06-27 | End: 2025-06-27

## 2025-06-27 RX ORDER — FENTANYL CITRATE 50 UG/ML
50 INJECTION, SOLUTION INTRAMUSCULAR; INTRAVENOUS
Status: DISCONTINUED | OUTPATIENT
Start: 2025-06-27 | End: 2025-06-27 | Stop reason: HOSPADM

## 2025-06-27 RX ORDER — SODIUM CHLORIDE, SODIUM LACTATE, POTASSIUM CHLORIDE, CALCIUM CHLORIDE 600; 310; 30; 20 MG/100ML; MG/100ML; MG/100ML; MG/100ML
125 INJECTION, SOLUTION INTRAVENOUS CONTINUOUS
Status: DISCONTINUED | OUTPATIENT
Start: 2025-06-27 | End: 2025-06-27

## 2025-06-27 RX ORDER — LIDOCAINE HYDROCHLORIDE 10 MG/ML
INJECTION, SOLUTION EPIDURAL; INFILTRATION; INTRACAUDAL; PERINEURAL AS NEEDED
Status: DISCONTINUED | OUTPATIENT
Start: 2025-06-27 | End: 2025-06-27

## 2025-06-27 RX ORDER — LIDOCAINE 50 MG/G
1 PATCH TOPICAL DAILY
Status: DISCONTINUED | OUTPATIENT
Start: 2025-06-27 | End: 2025-06-30 | Stop reason: HOSPADM

## 2025-06-27 RX ORDER — DEXAMETHASONE SODIUM PHOSPHATE 10 MG/ML
INJECTION, SOLUTION INTRAMUSCULAR; INTRAVENOUS AS NEEDED
Status: DISCONTINUED | OUTPATIENT
Start: 2025-06-27 | End: 2025-06-27

## 2025-06-27 RX ORDER — ACETAMINOPHEN 325 MG/1
975 TABLET ORAL ONCE
Status: COMPLETED | OUTPATIENT
Start: 2025-06-27 | End: 2025-06-27

## 2025-06-27 RX ORDER — BUPIVACAINE HYDROCHLORIDE 5 MG/ML
INJECTION, SOLUTION EPIDURAL; INTRACAUDAL; PERINEURAL AS NEEDED
Status: DISCONTINUED | OUTPATIENT
Start: 2025-06-27 | End: 2025-06-27 | Stop reason: HOSPADM

## 2025-06-27 RX ORDER — ACETAMINOPHEN 500 MG
1000 TABLET ORAL EVERY 6 HOURS PRN
Start: 2025-06-27 | End: 2025-06-30

## 2025-06-27 RX ORDER — OXYCODONE HYDROCHLORIDE 10 MG/1
10 TABLET ORAL EVERY 4 HOURS PRN
Status: DISCONTINUED | OUTPATIENT
Start: 2025-06-27 | End: 2025-06-27

## 2025-06-27 RX ORDER — ONDANSETRON 2 MG/ML
4 INJECTION INTRAMUSCULAR; INTRAVENOUS EVERY 6 HOURS PRN
Status: DISCONTINUED | OUTPATIENT
Start: 2025-06-27 | End: 2025-06-27 | Stop reason: HOSPADM

## 2025-06-27 RX ORDER — HYDROMORPHONE HYDROCHLORIDE 2 MG/ML
INJECTION, SOLUTION INTRAMUSCULAR; INTRAVENOUS; SUBCUTANEOUS AS NEEDED
Status: DISCONTINUED | OUTPATIENT
Start: 2025-06-27 | End: 2025-06-27

## 2025-06-27 RX ORDER — HYDROMORPHONE HCL/PF 1 MG/ML
0.2 SYRINGE (ML) INJECTION EVERY 4 HOURS PRN
Status: DISCONTINUED | OUTPATIENT
Start: 2025-06-27 | End: 2025-06-30 | Stop reason: HOSPADM

## 2025-06-27 RX ORDER — ONDANSETRON 2 MG/ML
INJECTION INTRAMUSCULAR; INTRAVENOUS AS NEEDED
Status: DISCONTINUED | OUTPATIENT
Start: 2025-06-27 | End: 2025-06-27

## 2025-06-27 RX ORDER — METRONIDAZOLE 500 MG/100ML
500 INJECTION, SOLUTION INTRAVENOUS ONCE
Status: COMPLETED | OUTPATIENT
Start: 2025-06-27 | End: 2025-06-27

## 2025-06-27 RX ORDER — OXYCODONE HYDROCHLORIDE 10 MG/1
10 TABLET ORAL EVERY 4 HOURS PRN
Refills: 0 | Status: DISCONTINUED | OUTPATIENT
Start: 2025-06-27 | End: 2025-06-30 | Stop reason: HOSPADM

## 2025-06-27 RX ORDER — SODIUM CHLORIDE, SODIUM LACTATE, POTASSIUM CHLORIDE, CALCIUM CHLORIDE 600; 310; 30; 20 MG/100ML; MG/100ML; MG/100ML; MG/100ML
20 INJECTION, SOLUTION INTRAVENOUS CONTINUOUS
Status: DISCONTINUED | OUTPATIENT
Start: 2025-06-27 | End: 2025-06-27

## 2025-06-27 RX ORDER — IBUPROFEN 600 MG/1
600 TABLET, FILM COATED ORAL EVERY 6 HOURS PRN
Start: 2025-06-27 | End: 2025-06-30

## 2025-06-27 RX ORDER — HYDROMORPHONE HCL/PF 1 MG/ML
0.5 SYRINGE (ML) INJECTION
Status: DISCONTINUED | OUTPATIENT
Start: 2025-06-27 | End: 2025-06-27 | Stop reason: HOSPADM

## 2025-06-27 RX ORDER — BENZONATATE 100 MG/1
100 CAPSULE ORAL 3 TIMES DAILY PRN
Status: DISCONTINUED | OUTPATIENT
Start: 2025-06-27 | End: 2025-06-30 | Stop reason: HOSPADM

## 2025-06-27 RX ORDER — KETOROLAC TROMETHAMINE 30 MG/ML
15 INJECTION, SOLUTION INTRAMUSCULAR; INTRAVENOUS EVERY 6 HOURS SCHEDULED
Status: COMPLETED | OUTPATIENT
Start: 2025-06-27 | End: 2025-06-28

## 2025-06-27 RX ORDER — ACETAMINOPHEN 10 MG/ML
1000 INJECTION, SOLUTION INTRAVENOUS EVERY 6 HOURS SCHEDULED
Status: DISCONTINUED | OUTPATIENT
Start: 2025-06-27 | End: 2025-06-29

## 2025-06-27 RX ORDER — MIDAZOLAM HYDROCHLORIDE 2 MG/2ML
INJECTION, SOLUTION INTRAMUSCULAR; INTRAVENOUS AS NEEDED
Status: DISCONTINUED | OUTPATIENT
Start: 2025-06-27 | End: 2025-06-27

## 2025-06-27 RX ORDER — VECURONIUM BROMIDE 1 MG/ML
INJECTION, POWDER, LYOPHILIZED, FOR SOLUTION INTRAVENOUS AS NEEDED
Status: DISCONTINUED | OUTPATIENT
Start: 2025-06-27 | End: 2025-06-27

## 2025-06-27 RX ORDER — DOCUSATE SODIUM 100 MG/1
100 CAPSULE, LIQUID FILLED ORAL 2 TIMES DAILY
Status: DISCONTINUED | OUTPATIENT
Start: 2025-06-27 | End: 2025-06-30 | Stop reason: HOSPADM

## 2025-06-27 RX ORDER — PHENAZOPYRIDINE HYDROCHLORIDE 100 MG/1
100 TABLET, FILM COATED ORAL
Status: DISCONTINUED | OUTPATIENT
Start: 2025-06-27 | End: 2025-06-30 | Stop reason: HOSPADM

## 2025-06-27 RX ORDER — ONDANSETRON 2 MG/ML
4 INJECTION INTRAMUSCULAR; INTRAVENOUS EVERY 6 HOURS PRN
Status: DISCONTINUED | OUTPATIENT
Start: 2025-06-27 | End: 2025-06-30 | Stop reason: HOSPADM

## 2025-06-27 RX ORDER — ENOXAPARIN SODIUM 100 MG/ML
60 INJECTION SUBCUTANEOUS EVERY 12 HOURS SCHEDULED
Status: DISCONTINUED | OUTPATIENT
Start: 2025-06-27 | End: 2025-06-30 | Stop reason: HOSPADM

## 2025-06-27 RX ORDER — IBUPROFEN 600 MG/1
600 TABLET, FILM COATED ORAL EVERY 6 HOURS SCHEDULED
Status: DISCONTINUED | OUTPATIENT
Start: 2025-06-28 | End: 2025-06-28

## 2025-06-27 RX ORDER — ACETAMINOPHEN 325 MG/1
975 TABLET ORAL EVERY 6 HOURS SCHEDULED
Status: DISCONTINUED | OUTPATIENT
Start: 2025-06-27 | End: 2025-06-27

## 2025-06-27 RX ORDER — SODIUM CHLORIDE, SODIUM LACTATE, POTASSIUM CHLORIDE, CALCIUM CHLORIDE 600; 310; 30; 20 MG/100ML; MG/100ML; MG/100ML; MG/100ML
125 INJECTION, SOLUTION INTRAVENOUS CONTINUOUS
Status: DISCONTINUED | OUTPATIENT
Start: 2025-06-27 | End: 2025-06-29

## 2025-06-27 RX ORDER — GLYCOPYRROLATE 0.2 MG/ML
INJECTION INTRAMUSCULAR; INTRAVENOUS AS NEEDED
Status: DISCONTINUED | OUTPATIENT
Start: 2025-06-27 | End: 2025-06-27

## 2025-06-27 RX ORDER — PROPOFOL 10 MG/ML
INJECTION, EMULSION INTRAVENOUS AS NEEDED
Status: DISCONTINUED | OUTPATIENT
Start: 2025-06-27 | End: 2025-06-27

## 2025-06-27 RX ORDER — OXYCODONE HYDROCHLORIDE 5 MG/1
5 TABLET ORAL EVERY 4 HOURS PRN
Refills: 0 | Status: DISCONTINUED | OUTPATIENT
Start: 2025-06-27 | End: 2025-06-29

## 2025-06-27 RX ORDER — OXYCODONE HYDROCHLORIDE 5 MG/1
5 TABLET ORAL EVERY 6 HOURS PRN
Qty: 10 TABLET | Refills: 0 | Status: SHIPPED | OUTPATIENT
Start: 2025-06-27 | End: 2025-06-29

## 2025-06-27 RX ORDER — POLYETHYLENE GLYCOL 3350 17 G/17G
17 POWDER, FOR SOLUTION ORAL DAILY
Status: DISCONTINUED | OUTPATIENT
Start: 2025-06-27 | End: 2025-06-30 | Stop reason: HOSPADM

## 2025-06-27 RX ORDER — ENOXAPARIN SODIUM 100 MG/ML
40 INJECTION SUBCUTANEOUS
Status: COMPLETED | OUTPATIENT
Start: 2025-06-27 | End: 2025-06-27

## 2025-06-27 RX ORDER — CEFAZOLIN SODIUM 2 G/50ML
2000 SOLUTION INTRAVENOUS ONCE
Status: COMPLETED | OUTPATIENT
Start: 2025-06-27 | End: 2025-06-27

## 2025-06-27 RX ORDER — MAGNESIUM HYDROXIDE 1200 MG/15ML
LIQUID ORAL AS NEEDED
Status: DISCONTINUED | OUTPATIENT
Start: 2025-06-27 | End: 2025-06-27 | Stop reason: HOSPADM

## 2025-06-27 RX ADMIN — ACETAMINOPHEN 975 MG: 325 TABLET ORAL at 06:12

## 2025-06-27 RX ADMIN — LIDOCAINE HYDROCHLORIDE 100 MG: 10 INJECTION, SOLUTION EPIDURAL; INFILTRATION; INTRACAUDAL at 07:38

## 2025-06-27 RX ADMIN — KETOROLAC TROMETHAMINE 30 MG: 30 INJECTION, SOLUTION INTRAMUSCULAR; INTRAVENOUS at 10:05

## 2025-06-27 RX ADMIN — BENZONATATE 100 MG: 100 CAPSULE ORAL at 15:43

## 2025-06-27 RX ADMIN — LIDOCAINE 1 PATCH: 700 PATCH TOPICAL at 15:01

## 2025-06-27 RX ADMIN — FENTANYL CITRATE 50 MCG: 50 INJECTION INTRAMUSCULAR; INTRAVENOUS at 07:38

## 2025-06-27 RX ADMIN — FENTANYL CITRATE 50 MCG: 50 INJECTION INTRAMUSCULAR; INTRAVENOUS at 09:17

## 2025-06-27 RX ADMIN — KETOROLAC TROMETHAMINE 15 MG: 30 INJECTION, SOLUTION INTRAMUSCULAR; INTRAVENOUS at 15:21

## 2025-06-27 RX ADMIN — GLYCOPYRROLATE 0.2 MG: 0.2 INJECTION, SOLUTION INTRAMUSCULAR; INTRAVENOUS at 08:16

## 2025-06-27 RX ADMIN — ENOXAPARIN SODIUM 40 MG: 40 INJECTION SUBCUTANEOUS at 06:20

## 2025-06-27 RX ADMIN — SODIUM CHLORIDE, SODIUM LACTATE, POTASSIUM CHLORIDE, AND CALCIUM CHLORIDE 125 ML/HR: .6; .31; .03; .02 INJECTION, SOLUTION INTRAVENOUS at 06:21

## 2025-06-27 RX ADMIN — VECURONIUM BROMIDE 3 MG: 1 INJECTION, POWDER, LYOPHILIZED, FOR SOLUTION INTRAVENOUS at 08:53

## 2025-06-27 RX ADMIN — ENOXAPARIN SODIUM 60 MG: 100 INJECTION SUBCUTANEOUS at 21:32

## 2025-06-27 RX ADMIN — KETOROLAC TROMETHAMINE 15 MG: 30 INJECTION, SOLUTION INTRAMUSCULAR; INTRAVENOUS at 21:31

## 2025-06-27 RX ADMIN — FENTANYL CITRATE 50 MCG: 50 INJECTION INTRAMUSCULAR; INTRAVENOUS at 08:38

## 2025-06-27 RX ADMIN — HYDROMORPHONE HYDROCHLORIDE 0.5 MG: 2 INJECTION INTRAMUSCULAR; INTRAVENOUS; SUBCUTANEOUS at 08:09

## 2025-06-27 RX ADMIN — PHENAZOPYRIDINE 100 MG: 100 TABLET ORAL at 16:50

## 2025-06-27 RX ADMIN — FENTANYL CITRATE 50 MCG: 50 INJECTION INTRAMUSCULAR; INTRAVENOUS at 08:22

## 2025-06-27 RX ADMIN — GLYCOPYRROLATE 0.2 MG: 0.2 INJECTION, SOLUTION INTRAMUSCULAR; INTRAVENOUS at 08:17

## 2025-06-27 RX ADMIN — METRONIDAZOLE: 500 INJECTION, SOLUTION INTRAVENOUS at 07:45

## 2025-06-27 RX ADMIN — OXYCODONE HYDROCHLORIDE 10 MG: 10 TABLET ORAL at 19:21

## 2025-06-27 RX ADMIN — PROPOFOL 200 MG: 10 INJECTION, EMULSION INTRAVENOUS at 07:38

## 2025-06-27 RX ADMIN — SODIUM CHLORIDE, SODIUM LACTATE, POTASSIUM CHLORIDE, AND CALCIUM CHLORIDE: .6; .31; .03; .02 INJECTION, SOLUTION INTRAVENOUS at 08:24

## 2025-06-27 RX ADMIN — SODIUM CHLORIDE, SODIUM LACTATE, POTASSIUM CHLORIDE, AND CALCIUM CHLORIDE: .6; .31; .03; .02 INJECTION, SOLUTION INTRAVENOUS at 09:17

## 2025-06-27 RX ADMIN — SODIUM CHLORIDE, SODIUM LACTATE, POTASSIUM CHLORIDE, AND CALCIUM CHLORIDE 125 ML/HR: .6; .31; .03; .02 INJECTION, SOLUTION INTRAVENOUS at 16:51

## 2025-06-27 RX ADMIN — SODIUM CHLORIDE, SODIUM LACTATE, POTASSIUM CHLORIDE, AND CALCIUM CHLORIDE 125 ML/HR: .6; .31; .03; .02 INJECTION, SOLUTION INTRAVENOUS at 23:19

## 2025-06-27 RX ADMIN — VECURONIUM BROMIDE 2 MG: 1 INJECTION, POWDER, LYOPHILIZED, FOR SOLUTION INTRAVENOUS at 08:22

## 2025-06-27 RX ADMIN — DOCUSATE SODIUM 100 MG: 100 CAPSULE, LIQUID FILLED ORAL at 17:46

## 2025-06-27 RX ADMIN — CEFAZOLIN SODIUM 3000 MG: 2 SOLUTION INTRAVENOUS at 07:42

## 2025-06-27 RX ADMIN — VECURONIUM BROMIDE 8 MG: 1 INJECTION, POWDER, LYOPHILIZED, FOR SOLUTION INTRAVENOUS at 07:38

## 2025-06-27 RX ADMIN — ACETAMINOPHEN 1000 MG: 10 INJECTION INTRAVENOUS at 23:16

## 2025-06-27 RX ADMIN — ACETAMINOPHEN 1000 MG: 10 INJECTION INTRAVENOUS at 17:54

## 2025-06-27 RX ADMIN — VECURONIUM BROMIDE 3 MG: 1 INJECTION, POWDER, LYOPHILIZED, FOR SOLUTION INTRAVENOUS at 09:24

## 2025-06-27 RX ADMIN — DEXAMETHASONE SODIUM PHOSPHATE 10 MG: 10 INJECTION, SOLUTION INTRAMUSCULAR; INTRAVENOUS at 07:59

## 2025-06-27 RX ADMIN — SUGAMMADEX 200 MG: 100 INJECTION, SOLUTION INTRAVENOUS at 10:08

## 2025-06-27 RX ADMIN — ONDANSETRON 4 MG: 2 INJECTION INTRAMUSCULAR; INTRAVENOUS at 10:05

## 2025-06-27 RX ADMIN — MIDAZOLAM HYDROCHLORIDE 2 MG: 1 INJECTION, SOLUTION INTRAMUSCULAR; INTRAVENOUS at 07:27

## 2025-06-27 NOTE — OP NOTE
OPERATIVE REPORT  PATIENT NAME: Mae Coleman    :  1970  MRN: [id number]  Pt Location: [facility] OR ROOM 08    SURGERY DATE: 2025    Surgeons and Role:     * Tommy Seymour MD - Primary     * Joselin Lopez PA-C - Assisting     * Katherine Horne MD - Assisting    Preop Diagnosis:  Endometrial hyperplasia [N85.00]    Post-Op Diagnosis Codes:     * Endometrial hyperplasia [N85.00]    Procedure(s):  HYSTERECTOMY LAPAROSCOPIC TOTAL (LTH) BSO W/ROBOTICS WITH MINI LAPAROTOMY. EXAM UNDER ANESTHESIA    Specimen(s):  ID Type Source Tests Collected by Time Destination   1 : uterus cervix, bilateral fallopian tubes and bilateral ovaries Tissue Uterus w/Bilateral Ovaries and Fallopian Tubes TISSUE EXAM Tommy Seymour MD 2025 [id number]        Estimated Blood Loss:   100 mL    Drains:  * No LDAs found *    Anesthesia Type:   General    Operative Indications:  Endometrial hyperplasia [N85.00]      Operative Findings:         Complications:   None    Procedure and Technique:  The patient was identified as herself and and appropriate time-out procedure was performed.    The patient was placed in dorsal lithotomy position and prepped and draped in the usual sterile fashion including a 3 times vaginal chlorhexadine prep.  Attention was turned to the vagina where of Luis catheter was placed without difficulty.  An EEA sizer was then placed into the vagina without difficulty.    Attention was then turned to the abdomen where planned incision sites were marked and infiltrated with 0.5% Marcaine.  The periumbilical incision was created with a knife.  The Veress needle was placed without difficulty at Holm's point.  The abdomen was insufflated with sterile gas.  The remainder of the incisions were created with a knife.  The 8 mm trocar was placed into the norma umbilical incision and a camera was placed without difficulty.  Under direct visualization the 3 other DaVinci 8 mm ports were placed without difficulty.  A 12 mm  assistant port was placed in the right lower quadrant without difficulty.  A second right upper quadrant 5 mm port was placed without difficulty.  The patient was placed in steep Trendelenburg position.  The robot was brought in and side docked without difficulty.  Electrocautery sheers were placed in the number 1 arm, a Vessel Sealer was placed in the 3. Arm, and a Prograsp was placed in the 4. Arm.    I broke scrub an approached the console.  The right round ligament was taken down with the Vessel Sealer.  The right pelvic sidewall was opened with cautery blade.  The perivesical and pararectal spaces were open.  The infundibular pelvic ligament and ureter on the right were  in this retroperitoneal space.  The path of the ureter was identified.  The infundibular pelvic ligament was then taken down with the Vessel Sealer in multiple bites.  The posterior broad ligament was taken down with electrocautery blade.  The bladder flap was begun with the electrocautery Blade and taken down to the upper vagina.  The uterine artery pedicle was skeletonized and taken down with the Vessel Sealer.  The cardinal ligament was taken down with sequential bites with the Vessel Sealer.  The uterus sacral ligament was taken down with the Vessel Sealer.     The same procedure was then performed on the patient's left-hand side again taking care to identify the ureter prior to taking down the infundibular pelvic ligament.  The vaginal cuff was then taken down with Electrocautery Blade.  The specimen was unable to be retrieved through the vagina with a single tooth tenaculum.  It was elected to perform a mini laparotomy the vaginal cuff was closed with a running suture of 2 0 Stratafix. The abdomen was explored and no further bleeding sites were noted.      A midline incision was created in the supraumbilical portion of the abdomen with a knife and carried through to the fascia with the Bovie electrocautery device.  The fascia  was incised with the Bovie electrocautery device and the peritoneum was entered sharply.  The uterus was removed through this incision with tubes and ovaries attached.  The abdomen is irrigated the fascia was closed with #1 PDS in a running fashion.  Subcutaneous tissue was irrigated and then closed with 2-0 Vicryl.  The skin was closed with 4-0 Monocryl.    The pneumoperitoneum was allowed to escape and no bleeding was noted.  All instruments were removed.  The trocars were removed.  The incision sites were closed with 4 0 Monocryl without difficulty.    The patient tolerated procedure without complications.  The sponge and instrument counts were correct prior to closure.  Hemostasis was assured prior to closure.  The patient was brought to the recovery room in stable condition.    Tommy Seymour MD  Division of GYN Oncology  [facility].    I was present for the entire procedure.    Patient Disposition:  PACU          SIGNATURE: Tommy Seymour MD  DATE: June 27, 2025  TIME: 11:32 AM

## 2025-06-27 NOTE — QUICK NOTE
Notified by nursing that patient was c/o difficulty breathing. I presented to bedside to evaluate the patient. She is alert and oriented. She states she has felt like she couldn't breathe right since waking up and that she cannot swallow anything without choking. She denies CP, N/V. Not yet passing flatus. Voiding spontaneously without issue. Vitals pulse 76, /77, SpO2 96% on 2L NC. RRR, lungs CTAB with good air movement. Abdomen soft, non-distended, appropriately tender post operatively. Incisions C/D/I, mini-lap covered by clean, dry mepilex. No vaginal bleeding noted.     Discussed with anesthesia who also evaluated the patient post op. Suspect oropharyngeal irritation from ETT. They recommend tessalon pearls and advancing diet slowly based on patient tolerance.     Plan:  Admit for observation   Advance diet as tolerated  IV Toradol SVH, IV tylenol DONNELL for pain given current intolerance to PO  Lidocaine patch   Oxy 5 mg/10 mg po currently ordered PRN with IV dilaudid for breathrough. Consider switching to IV  Continue IVF until tolerating po   Lovenox 60 mg BID for DVT ppx     Discussed with Dr. Lee

## 2025-06-27 NOTE — H&P
"H&P Exam - Gynecology  Mae Coleman 54 y.o. female MRN: [id number]  Unit/Bed#: OR POOL Encounter: [id number]        Assessment & Plan   Endometrial hyperplasia  Assessment & Plan  Plan for scheduled EUA, RA-TLH, BSO, possible staging            History of Present Illness     HPI:  Mae Coleman is a 54 y.o. female who presents for scheduled  EUA, RA-TLH, BSO, possible staging. Today, she feels well without complaint. There have been no major changes to her health since her previous visit.    Review of Systems   Constitutional:  Negative for chills and fever.   HENT:  Negative for congestion, rhinorrhea, sneezing and sore throat.    Eyes:  Negative for photophobia and visual disturbance.   Respiratory:  Negative for cough and shortness of breath.    Cardiovascular:  Negative for chest pain.   Gastrointestinal:  Negative for diarrhea, nausea and vomiting.   Genitourinary:  Negative for dysuria and frequency.   Neurological:  Negative for dizziness, numbness and headaches.   Psychiatric/Behavioral: Negative.         Historical Information   Past Medical History[1]  Past Surgical History[2]  OB History    Para Term  AB Living   3 1 1  2    SAB IAB Ectopic Multiple Live Births   2          # Outcome Date GA Lbr Jose Raul/2nd Weight Sex Type Anes PTL Lv   3 SAB            2 SAB            1 Term               Obstetric Comments   Menarche 7   Was told she had PCOS after giving birth to her daughter in .   She has not yet been without bleeding.   Vaginal birth     Family History[3]  Social History   Social History     Substance and Sexual Activity   Alcohol Use Never     Social History     Substance and Sexual Activity   Drug Use Never     Tobacco Use History[4]    Meds/Allergies   No medications prior to admission.  Allergies[5]    Objective     /75   Pulse 74   Temp 97.5 °F (36.4 °C) (Temporal)   Resp 18   Ht 5' 1\" (1.549 m)   Wt 129 kg (283 lb 4.7 oz)   SpO2 96%   BMI 53.53 kg/m²     No " intake/output data recorded.  No intake/output data recorded.    Lab Results   Component Value Date    WBC 7.26 06/11/2025    HGB 14.2 06/11/2025    HCT 44.7 06/11/2025    MCV 93 06/11/2025     06/11/2025       Lab Results   Component Value Date    CALCIUM 9.1 06/11/2025    K 4.3 06/11/2025    CO2 29 06/11/2025     06/11/2025    BUN 11 06/11/2025    CREATININE 0.70 06/11/2025       Physical Exam  Vitals reviewed. Exam conducted with a chaperone present.   Constitutional:       Appearance: Normal appearance. She is obese.   HENT:      Head: Normocephalic and atraumatic.      Mouth/Throat:      Pharynx: Oropharynx is clear.     Eyes:      General: No scleral icterus.     Extraocular Movements: Extraocular movements intact.       Cardiovascular:      Rate and Rhythm: Normal rate and regular rhythm.      Pulses: Normal pulses.      Heart sounds: Normal heart sounds.   Pulmonary:      Effort: Pulmonary effort is normal. No respiratory distress.      Breath sounds: Normal breath sounds.   Abdominal:      Palpations: Abdomen is soft.      Tenderness: There is no abdominal tenderness. There is no guarding or rebound.   Genitourinary:     Comments: SVE deferred to OR    Musculoskeletal:      Right lower leg: No edema.      Left lower leg: No edema.     Skin:     General: Skin is warm and dry.     Neurological:      Mental Status: She is alert and oriented to person, place, and time.         Katherine Horne MD  6/27/2025  7:27 AM        [1]   Past Medical History:  Diagnosis Date    Adult BMI 50.0-59.9 kg/sq m (HCC)     Obesity     Polycystic ovary syndrome    [2]   Past Surgical History:  Procedure Laterality Date    BREAST BIOPSY Left 08/29/2019    benign    BREAST CYST ASPIRATION Left 08/29/2019    benign    CHOLECYSTECTOMY      DE QUERVAIN'S RELEASE      x 2    GALLBLADDER SURGERY      US BREAST CYST ASPIRATION LEFT INITIAL Left 08/29/2019   [3]   Family History  Problem Relation Name Age of Onset     Hypertension Mother      Stroke Mother      Diabetes Mother      Kidney failure Mother      Diabetes Father      No Known Problems Sister evi     Hypertension Sister namita     Hypothyroidism Sister namita     Cancer Sister Michelle- 1/2 sister         anorectal cancer    Breast cancer Sister Michelle- 1/2 sister         in 40s    No Known Problems Daughter      No Known Problems Maternal Grandmother      No Known Problems Maternal Grandfather      No Known Problems Paternal Grandmother      No Known Problems Paternal Grandfather      No Known Problems Maternal Aunt      No Known Problems Maternal Aunt      No Known Problems Paternal Aunt      No Known Problems Paternal Aunt      Breast cancer Half-Sister michelle 49    Colon cancer Half-Sister michelle 57    Breast cancer additional onset Half-Sister michelle         total 4xs breast cancer    Endometrial cancer Half-Sister ky 54    Pancreatic cancer Half-Sister ky     Kidney failure Half-Sister dina     Substance Abuse Half-Brother      No Known Problems Half-Brother      Ovarian cancer Neg Hx      Uterine cancer Neg Hx     [4]   Social History  Tobacco Use   Smoking Status Former    Types: Cigarettes   Smokeless Tobacco Never   [5] No Known Allergies

## 2025-06-27 NOTE — INTERVAL H&P NOTE
H&P reviewed. After examining the patient I find no changes in the patients condition since the H&P had been written.  Plan R/A TLH BSO possible staging for Endometrial hyperplasia. Preop testing stable for surgery  Vitals:    06/27/25 0542   BP: 127/75   Pulse: 74   Resp: 18   Temp: 97.5 °F (36.4 °C)   SpO2: 96%

## 2025-06-27 NOTE — ANESTHESIA POSTPROCEDURE EVALUATION
Post-Op Assessment Note    CV Status:  Stable  Pain Score: 1    Pain management: adequate       Mental Status:  Alert and awake   Hydration Status:  Euvolemic   PONV Controlled:  Controlled   Airway Patency:  Patent     Post Op Vitals Reviewed: Yes    No anethesia notable event occurred.    Staff: Anesthesiologist           Last Filed PACU Vitals:  Vitals Value Taken Time   Temp 97.9 °F (36.6 °C) 06/27/25 11:35   Pulse 78 06/27/25 12:01   /85 06/27/25 11:50   Resp 18 06/27/25 11:50   SpO2 90 % 06/27/25 12:01   Vitals shown include unfiled device data.    Modified Nain:     Vitals Value Taken Time   Activity 2 06/27/25 11:05   Respiration 2 06/27/25 11:05   Circulation 2 06/27/25 11:05   Consciousness 1 06/27/25 11:05   Oxygen Saturation 1 06/27/25 11:05     Modified Nain Score: 8

## 2025-06-27 NOTE — DISCHARGE INSTR - AVS FIRST PAGE
[facility] - Gynecologic Oncology  Dimitri Turner, Alvarado Seymour and Rufus   [phone]    Hysterectomy Discharge Instructions    A hysterectomy is surgery to remove your uterus. Your ovaries, fallopian tubes, cervix, or part of your vagina may also need to be removed. The organs and tissue that will be removed depends on your medical condition.  After a hysterectomy, you will not be able to become pregnant.  If your ovaries are removed, you will go through menopause if you have not already.    DISCHARGE INSTRUCTIONS:     What to expect at home:   Recovery from surgery is generally 2-4 weeks, but sometimes longer for more strenuous activity. It is normal to be very tired during this time.   If you had laparoscopic/robotic surgery, you may experience gas pain, abdominal swelling, or shoulder pain for 24-72 hours after surgery. This is from the carbon dioxide gas put into your abdomen to better visualize your organs. A warm shower, heating pad, and/or walking may help.    Contact your doctor at the number above if:   You have a fever over 100.4o.  You have nausea or vomiting that does not improve after a light meal.  Your abdominal or pelvic pain gets worse, even after you take medicine.  You feel pain or burning when you urinate, or you have trouble urinating that worsens over time. Some burning in the first 1-2 days after surgery is expected after removal of the bladder catheter during surgery.  You have pus or a foul-smelling odor coming from your vagina.  Your wound is red, swollen, or drainage is persistent, thick/cloudy or foul smelling.  Clear/pink drainage or a minimal amount of bleeding from incisions can be normal after laparoscopic surgery.  Your arm or leg feels warm, tender, red, swollen and/or painful.   You have heavy vaginal bleeding that fills 1 or more sanitary pads in 1 hour. It is normal to have a small amount of vaginal bleeding or discharge after surgery  that would require the use of a light pantiliner. This discharge may last up to 6 weeks. The bleeding and discharge should be light and should have no odor.     CALL 911 OR GO TO THE EMERGENCY ROOM IF YOU HAVE: Any shortness of breath, difficulty breathing, or chest pain.    Pain  Pain after surgery is a challenging part of the healing process. Pain may be present for weeks but should gradually get better.   Please take extra strength acetaminophen (Tylenol) 1000 mg every 6 hours and then alternate with a NSAIDs such as ibuprofen (Advil, Motrin) 600 mg (3 tablets) every 6 hours to help decrease swelling, pain, and fever.   NSAIDs can cause stomach bleeding or kidney problems in certain people. If you take blood thinner medicine, always ask your healthcare provider if NSAIDs are safe for you. Always read the medicine label and follow directions.  The maximum dose of Tylenol is 4000 mg in 24 hours. The maximum dose of Advil/Motrin is 2400mg in 24 hours.   For moderate to severe pain, please take oxycodone 5 mg PO every 4-6 hours as needed or other narcotic that was prescribed by provider.     Anticoagulation    If provided with a prescription for anticoagulation such as Apixaban (Eliquis), please take the prescribed dose until completed.     Constipation  Constipation is common and it is normal to not have a bowel movement for up to one week after surgery. You should still be passing gas despite constipation and should be able to tolerate both liquid and solid food without nausea or vomiting.  Please take polyethylene glycol (Miralax) 17 g (1 measured capful or 1 packet) once a day. If you have not had a bowel movement 3 days after surgery, you may take the Miralax two times a day. The alternatives to Miralax include Senna and Colace.     If you have any discomfort because of the need to have a bowel movement, you may add milk of magnesia or magnesium citrate (available at your local pharmacy without a prescription)  at any time. Do not take milk of magnesia or magnesium citrate if you have kidney failure.   If you have loose or watery stools, stop taking the medications.     Take your medicine as directed. Contact your healthcare provider if you think your medicine is not helping or if you have side effects. Tell him or her if you are allergic to any medicine. Keep a list of the medicines, vitamins, and herbs you take. Include the amounts, and when and why you take them. Bring the list of the pill bottles to follow-up visits. Carry your medicine list with you in case of an emergency.    Activity:   Rest as needed. Get up and move around as directed to help prevent blood clots. Start with short walks and slowly increase the distance every day.     Stairs are okay to use. Use two feet on each stair to go up and down during the first several days to weeks after surgery.     Do not lift objects heavier than 10 pounds for 6 weeks whether you have small, laparoscopic incisions or you have a large, laparotomy incision.    Avoid strenuous activity for 2 weeks.     You may shower as soon as you return home. You can use soapy water surrounding the incision and let the water run over it. Pat the incision dry after your shower (see wound care section below)     Do not strain during bowel movements. High-fiber foods and extra liquids can help you prevent constipation. Examples of high-fiber foods are fruit and bran. Prune juice and water are good liquids to drink.      Do not have sex, use tampons, or douche for up to 8 weeks.   Do not go into pools or hot tubs for up to 8 weeks and/or until you have been cleared by your doctor.      It is generally safe to drive if you feel strong enough and your reaction time is not compromised and when no longer taking prescription/opioid pain medication    Ask when you may return to work and to other regular activities.    Wound care:   Carefully wash around the wound with soap and water. If you have  Hibiclens or medicated soap that you were instructed to use before surgery, you may use that to wash with for up to 2 days after surgery.  If not, any mild non-scented, non-abrasive soap is safe.  It is okay to let the soap and water run over your incision. Do not scrub your incision. Dry the area.    If you have surgical glue over your incision, this will start to flake off 7-10 days postoperatively. When this occurs, you can peel off the remaining glue.    Surgical glue reactions are common. If this occurs, please take a dose of Benadryl 25-50 mg every 4-6 hours. The maximum dose of Benadryl is 300mg/day. You may also apply over-the-counter hydrocortisone around the incision area (not on top of the incision).     Check your incision every day for redness, swelling, or pus.   The bandage in the midline may be removed 7 days after surgery.    Deep breathing:   Take deep breaths and cough 10 times each hour. This will decrease your risk of a lung infection as this will open your airway. Take a deep breath and hold it for as long as you can. Let the air out and then cough strongly.     You may be given an incentive spirometer to help you take deep breaths. Put the plastic piece in your mouth and take a slow, deep breath, then let the air out and cough.     Get support:   This surgery may be life-changing for you and your family. You will no longer be able to get pregnant. Sudden changes in the levels of your hormones may occur and cause mood swings and depression. You may feel angry, sad, frightened, or cry frequently and unexpectedly. These feelings are normal. Talk to your healthcare provider about where you can get support. You can also ask if hormone replacement medicine is right for you. Write down your questions so you remember to ask them during your visits.

## 2025-06-27 NOTE — ASSESSMENT & PLAN NOTE
Postop day 1 status post robot hysterectomy BSO and mini laparotomy for specimen extraction for EIN    FEN minimal p.o. intake due to oral pharyngeal swelling.  Patient using Chloraseptic and Cepacol lozenges we will advance diet as tolerated.  Pain control stable at present.  Patient ambulating and voiding without difficulty.  I's and O's appropriate.  Cough continue to use a stent of spirometer.  Postoperative blood work shows CBC with white count of 12 and hemoglobin of 12.  All blood work is stable.

## 2025-06-27 NOTE — PERIOPERATIVE NURSING NOTE
Discussed pt oxygen saturation status with Dr. Vail. Dr. Vail to bedside to evaluate patient. Lungs clear and equal.. Per Dr. Vail patient is maintaining saturation in low 90s while off oxygen and can proceed to same day surgery. Pt using incentive spirometer independently and is improving her volume getting up to [phone]. Will continue to use incentive spirometer independently/

## 2025-06-28 PROBLEM — R49.0 DYSPHONIA: Status: ACTIVE | Noted: 2025-06-28

## 2025-06-28 PROBLEM — R13.10 DYSPHAGIA, UNSPECIFIED: Status: ACTIVE | Noted: 2025-06-28

## 2025-06-28 PROBLEM — K21.9 GASTROESOPHAGEAL REFLUX DISEASE: Status: ACTIVE | Noted: 2025-06-28

## 2025-06-28 PROBLEM — J38.01 PARESIS OF LEFT VOCAL CORD: Status: ACTIVE | Noted: 2025-06-28

## 2025-06-28 LAB
ANION GAP SERPL CALCULATED.3IONS-SCNC: 8 MMOL/L (ref 4–13)
BUN SERPL-MCNC: 10 MG/DL (ref 5–25)
CALCIUM SERPL-MCNC: 8 MG/DL (ref 8.4–10.2)
CHLORIDE SERPL-SCNC: 104 MMOL/L (ref 96–108)
CO2 SERPL-SCNC: 25 MMOL/L (ref 21–32)
CREAT SERPL-MCNC: 0.66 MG/DL (ref 0.6–1.3)
ERYTHROCYTE [DISTWIDTH] IN BLOOD BY AUTOMATED COUNT: 13.8 % (ref 11.6–15.1)
GFR SERPL CREATININE-BSD FRML MDRD: 99 ML/MIN/1.73SQ M
GLUCOSE SERPL-MCNC: 110 MG/DL (ref 65–140)
HCT VFR BLD AUTO: 40.1 % (ref 34.8–46.1)
HGB BLD-MCNC: 12 G/DL (ref 11.5–15.4)
MCH RBC QN AUTO: 29.5 PG (ref 26.8–34.3)
MCHC RBC AUTO-ENTMCNC: 29.9 G/DL (ref 31.4–37.4)
MCV RBC AUTO: 99 FL (ref 82–98)
PLATELET # BLD AUTO: 151 THOUSANDS/UL (ref 149–390)
PMV BLD AUTO: 11.2 FL (ref 8.9–12.7)
POTASSIUM SERPL-SCNC: 4.2 MMOL/L (ref 3.5–5.3)
RBC # BLD AUTO: 4.07 MILLION/UL (ref 3.81–5.12)
SODIUM SERPL-SCNC: 137 MMOL/L (ref 135–147)
WBC # BLD AUTO: 11.53 THOUSAND/UL (ref 4.31–10.16)

## 2025-06-28 PROCEDURE — 99024 POSTOP FOLLOW-UP VISIT: CPT | Performed by: OBSTETRICS & GYNECOLOGY

## 2025-06-28 PROCEDURE — 31575 DIAGNOSTIC LARYNGOSCOPY: CPT | Performed by: OTOLARYNGOLOGY

## 2025-06-28 PROCEDURE — 85027 COMPLETE CBC AUTOMATED: CPT

## 2025-06-28 PROCEDURE — 80048 BASIC METABOLIC PNL TOTAL CA: CPT

## 2025-06-28 PROCEDURE — 99222 1ST HOSP IP/OBS MODERATE 55: CPT | Performed by: OTOLARYNGOLOGY

## 2025-06-28 RX ORDER — KETOROLAC TROMETHAMINE 30 MG/ML
15 INJECTION, SOLUTION INTRAMUSCULAR; INTRAVENOUS EVERY 6 HOURS PRN
Status: DISCONTINUED | OUTPATIENT
Start: 2025-06-28 | End: 2025-06-29

## 2025-06-28 RX ORDER — PANTOPRAZOLE SODIUM 40 MG/1
40 TABLET, DELAYED RELEASE ORAL
Status: DISCONTINUED | OUTPATIENT
Start: 2025-06-29 | End: 2025-06-30 | Stop reason: HOSPADM

## 2025-06-28 RX ADMIN — OXYCODONE HYDROCHLORIDE 10 MG: 10 TABLET ORAL at 09:16

## 2025-06-28 RX ADMIN — ACETAMINOPHEN 1000 MG: 10 INJECTION INTRAVENOUS at 05:06

## 2025-06-28 RX ADMIN — KETOROLAC TROMETHAMINE 15 MG: 30 INJECTION, SOLUTION INTRAMUSCULAR; INTRAVENOUS at 20:01

## 2025-06-28 RX ADMIN — ACETAMINOPHEN 1000 MG: 10 INJECTION INTRAVENOUS at 17:03

## 2025-06-28 RX ADMIN — ENOXAPARIN SODIUM 60 MG: 100 INJECTION SUBCUTANEOUS at 20:36

## 2025-06-28 RX ADMIN — PHENAZOPYRIDINE 100 MG: 100 TABLET ORAL at 17:03

## 2025-06-28 RX ADMIN — ACETAMINOPHEN 1000 MG: 10 INJECTION INTRAVENOUS at 12:46

## 2025-06-28 RX ADMIN — DOCUSATE SODIUM 100 MG: 100 CAPSULE, LIQUID FILLED ORAL at 08:59

## 2025-06-28 RX ADMIN — DOCUSATE SODIUM 100 MG: 100 CAPSULE, LIQUID FILLED ORAL at 17:03

## 2025-06-28 RX ADMIN — KETOROLAC TROMETHAMINE 15 MG: 30 INJECTION, SOLUTION INTRAMUSCULAR; INTRAVENOUS at 10:44

## 2025-06-28 RX ADMIN — ONDANSETRON 4 MG: 2 INJECTION INTRAMUSCULAR; INTRAVENOUS at 10:44

## 2025-06-28 RX ADMIN — SODIUM CHLORIDE, SODIUM LACTATE, POTASSIUM CHLORIDE, AND CALCIUM CHLORIDE 125 ML/HR: .6; .31; .03; .02 INJECTION, SOLUTION INTRAVENOUS at 19:57

## 2025-06-28 RX ADMIN — POLYETHYLENE GLYCOL 3350 17 G: 17 POWDER, FOR SOLUTION ORAL at 08:58

## 2025-06-28 RX ADMIN — PHENAZOPYRIDINE 100 MG: 100 TABLET ORAL at 08:59

## 2025-06-28 RX ADMIN — PHENAZOPYRIDINE 100 MG: 100 TABLET ORAL at 12:46

## 2025-06-28 RX ADMIN — KETOROLAC TROMETHAMINE 15 MG: 30 INJECTION, SOLUTION INTRAMUSCULAR; INTRAVENOUS at 05:06

## 2025-06-28 RX ADMIN — LIDOCAINE 1 PATCH: 700 PATCH TOPICAL at 14:07

## 2025-06-28 RX ADMIN — ENOXAPARIN SODIUM 60 MG: 100 INJECTION SUBCUTANEOUS at 09:03

## 2025-06-28 NOTE — UTILIZATION REVIEW
Initial Clinical Review    Elective OUTPT surgical procedure 6/27@1533 UPGRADED TO INPT 6/28@1514 D/T HYSTERECTOMY LAPAROSCOPIC TOTAL W/ POST-OP DIFFICULTY BREATHING AND SWALLOWING AND PAIN WITH NEED FOR O2 SAT MONITORING, IV TORADOL, IV ZOFRAN.    Age/Sex: 54 y.o. female  Surgery Date: 6/27/25  Procedure: HYSTERECTOMY LAPAROSCOPIC TOTAL (LTH) BSO W/ROBOTICS WITH MINI LAPAROTOMY. EXAM UNDER ANESTHESIA   Anesthesia: General   Operative Findings:       6/28: POD#1 Progress Note: Postop day 1 status post robot hysterectomy BSO and mini laparotomy for specimen extraction for EIN     FEN minimal p.o. intake due to oral pharyngeal swelling.  Patient using Chloraseptic and Cepacol lozenges we will advance diet as tolerated.  Pain control stable at present.  Patient ambulating and voiding without difficulty.  I's and O's appropriate.  Cough continue to use a stent of spirometer.  Postoperative blood work shows CBC with white count of 12 and hemoglobin of 12.  All blood work is stable.    Patient had somewhat of a difficult night.  She is out of bed.  Pain is somewhat controlled with oral opioid and IV Toradol.  Level is approximately 5.  Patient having difficulty swallowing due to throat swelling.  Has tried Cepacol and Chloraseptic.  Presently is only able to get down ice chips.     Patient also coughing when doing incentive spirometry.  Suspect oropharyngeal irritation from ETT. They recommend tessalon pearls and advancing diet slowly based on patient tolerance.     D2: 6/29:POD2 status post robot hysterectomy BSO and mini laparotomy for specimen extraction for EIN, postop course c/b partial vocal cord paralysis, clinically improving. able to speak more comfortably today without coughing. She noted 1 episode of severe pelvic cramping with voiding today. She does note urinary urgency however dysuria has resolved. She is passing flatus. She is ambulating. She is eager for discharge home if she is able to tolerate p.o. pain  medications.      FEN: NPO pending speech/swallow eval. Patient using Chloraseptic and Cepacol lozenges. We will advance diet as tolerated.  Pain control stable at present with IV tylenol/toradol, lidocaine patches.   Patient ambulating and voiding without difficulty.  I's and O's appropriate.  Continue to incentive spirometer.  Reviewed dc instructions in detail in case of possible dc today.      Dispo: pending speech / swallow eval and subsequent ENT / recommendations; if unable to tolerate PO pills, will require continued inpatient admission.     Admission Orders: Date/Time/Statement:   Admission Orders (From admission, onward)       Ordered        06/28/25 1514  INPATIENT ADMISSION  Once            06/27/25 1408  Place in Observation  Once,   Status:  Canceled                          Orders Placed This Encounter   Procedures    INPATIENT ADMISSION     Standing Status:   Standing     Number of Occurrences:   1     Level of Care:   Med Surg [16]     Estimated length of stay:   More than 2 Midnights     Certification:   I certify that inpatient services are medically necessary for this patient for a duration of greater than two midnights. See H&P and MD Progress Notes for additional information about the patient's course of treatment.     Diet: REG  Mobility: OOB  DVT Prophylaxis: SCD    Medications/Pain Control:   Scheduled Medications:  acetaminophen, 1,000 mg, Intravenous, Q6H DONNELL  docusate sodium, 100 mg, Oral, BID  enoxaparin, 60 mg, Subcutaneous, Q12H DONNELL  ibuprofen, 600 mg, Oral, Q6H DONNELL  lidocaine, 1 patch, Topical, Daily  [START ON 6/29/2025] pantoprazole, 40 mg, Oral, Early Morning  phenazopyridine, 100 mg, Oral, TID With Meals  polyethylene glycol, 17 g, Oral, Daily     ketorolac (TORADOL) injection 15 mg  Dose: 15 mg  Freq: Every 6 hours scheduled Route: IV  Start: 06/27/25 1600 End: 06/28/25 1044   Admin Instructions:               1521     2131      0506     1044        Continuous IV  Infusions:  lactated ringers, 125 mL/hr, Intravenous, Continuous      PRN Meds:  benzonatate, 100 mg, Oral, TID PRN 6/27 X 1  HYDROmorphone, 0.2 mg, Intravenous, Q4H PRN  ondansetron, 4 mg, Intravenous, Q6H PRN 6/28 X 1  oxyCODONE, 10 mg, Oral, Q4H PRN 6/27 X 1, 6/28 X 1  oxyCODONE, 5 mg, Oral, Q4H PRN  ketorolac (TORADOL) injection 15 mg  Dose: 15 mg  Freq: Every 6 hours PRN Route: IV  PRN Reason: severe pain  Start: 06/28/25 1957 End: 06/30/25 1956   Admin Instructions:               2001 1218          Vital Signs (last 3 days)       Date/Time Temp Pulse Resp BP MAP (mmHg) SpO2 Calculated FIO2 (%) - Nasal Cannula O2 Flow Rate (L/min) Nasal Cannula O2 Flow Rate (L/min) O2 Device Patient Position - Orthostatic VS Pain    06/29/25 12:24:20 -- 60 -- 176/89 118 97 % -- -- -- -- -- --    06/29/25 1218 -- -- -- -- -- -- -- -- -- -- -- 6 06/29/25 0900 -- -- -- -- -- -- -- -- -- -- -- 3    06/29/25 07:58:43 98.6 °F (37 °C) 63 16 123/68 86 93 % -- -- -- -- -- --    06/28/25 23:24:35 98.6 °F (37 °C) 65 18 124/76 92 98 % 28 -- 2 L/min Nasal cannula Lying --    06/28/25 2001 -- -- -- -- -- -- -- -- -- -- -- 7 06/28/25 19:00:26 98.2 °F (36.8 °C) 61 20 153/85 108 97 % -- -- -- -- -- --    06/28/25 1703 -- -- -- -- -- -- -- -- -- -- -- 3    06/28/25 15:18:34 98.4 °F (36.9 °C) 58 18 139/81 100 98 % -- -- -- -- -- --    06/28/25 1044 -- -- -- -- -- -- -- -- -- -- -- 5 06/28/25 0916 -- -- -- -- -- -- -- -- -- -- -- 8 06/28/25 0905 -- -- -- -- -- 98 % -- -- -- -- -- --    06/28/25 07:16:20 98.2 °F (36.8 °C) 72 16 111/63 79 99 % -- -- -- -- -- --    06/28/25 0506 -- -- -- -- -- -- -- -- -- -- -- 7 06/28/25 03:02:06 98.3 °F (36.8 °C) 78 18 127/70 89 97 % 28 -- 2 L/min Nasal cannula Lying --    06/27/25 23:21:45 98.3 °F (36.8 °C) 79 17 125/67 86 94 % 28 -- 2 L/min Nasal cannula Lying --    06/27/25 2312 -- -- -- -- -- -- -- -- -- -- -- 4 06/27/25 2131 -- -- -- -- -- -- -- -- -- -- -- 7 06/27/25 1921 -- -- -- --  -- -- -- -- -- -- -- 7 06/27/25 19:16:14 98.4 °F (36.9 °C) 80 18 151/82 105 97 % 28 -- 2 L/min Nasal cannula Sitting --    06/27/25 17:22:56 98.4 °F (36.9 °C) 78 18 134/78 97 98 % 28 -- 2 L/min Nasal cannula Lying --    06/27/25 1600 98 °F (36.7 °C) 73 18 137/77 -- 98 % 28 -- 2 L/min None (Room air) -- --    06/27/25 1521 -- -- -- -- -- -- -- -- -- -- -- 7 06/27/25 1430 97.5 °F (36.4 °C) 79 18 130/77 -- 96 % 28 -- 2 L/min Nasal cannula -- 6 06/27/25 1345 98.3 °F (36.8 °C) 77 18 132/76 -- 93 % -- -- -- None (Room air) -- 6 06/27/25 1336 -- 78 -- 108/53 76 91 % -- -- -- None (Room air) -- No Pain    06/27/25 1305 -- 72 16 123/60 82 94 % 28 -- 2 L/min Nasal cannula -- No Pain    06/27/25 1250 -- 70 16 124/62 84 93 % 28 -- 2 L/min Nasal cannula -- No Pain    06/27/25 1235 -- 74 17 114/60 81 93 % 28 -- 2 L/min Nasal cannula -- No Pain    06/27/25 1220 -- 76 16 113/55 78 95 % 28 -- 2 L/min Nasal cannula -- No Pain    06/27/25 1208 -- -- -- -- -- 91 % 28 -- 2 L/min Nasal cannula -- --    06/27/25 1205 97.2 °F (36.2 °C) 74 18 137/74 99 91 % -- -- -- None (Room air) -- No Pain    06/27/25 1150 -- 76 18 150/85 110 92 % 28 -- 2 L/min Nasal cannula -- No Pain    06/27/25 1135 97.9 °F (36.6 °C) 78 17 150/81 110 93 % 28 -- 2 L/min Nasal cannula -- --    06/27/25 1120 -- 74 16 153/80 108 97 % -- 4 L/min -- Simple mask -- --    06/27/25 1105 -- 82 16 169/80 115 99 % -- 10 L/min -- Simple mask -- --    06/27/25 1054 97.1 °F (36.2 °C) 82 14 175/79 112 100 % -- 10 L/min -- Simple mask -- --    06/27/25 0624 -- -- -- -- -- -- -- -- -- -- -- No Pain    06/27/25 0612 -- -- -- -- -- -- -- -- -- -- -- No Pain    06/27/25 0542 97.5 °F (36.4 °C) 74 18 127/75 -- 96 % -- -- -- None (Room air) -- --          Weight (last 2 days)       Date/Time Weight    06/27/25 0603 129 (283.29)            Pertinent Labs/Diagnostic Test Results:   Radiology:  FL barium swallow video w speech   Final Interpretation by SYSTEMGENERATED, DOCUMENTATION  (06/29 1159)      FL barium swallow ROUTINE esophagus    (Results Pending)           Results from last 7 days   Lab Units 06/29/25  0424 06/28/25  0521   WBC Thousand/uL 7.22 11.53*   HEMOGLOBIN g/dL 11.6 12.0   HEMATOCRIT % 36.2 40.1   PLATELETS Thousands/uL 224 151         Results from last 7 days   Lab Units 06/29/25  0424 06/28/25  0521   SODIUM mmol/L 140 137   POTASSIUM mmol/L 3.6 4.2   CHLORIDE mmol/L 104 104   CO2 mmol/L 31 25   ANION GAP mmol/L 5 8   BUN mg/dL 10 10   CREATININE mg/dL 0.67 0.66   EGFR ml/min/1.73sq m 99 99   CALCIUM mg/dL 8.4 8.0*             Results from last 7 days   Lab Units 06/29/25  0424 06/28/25  0521   GLUCOSE RANDOM mg/dL 85 110         Network Utilization Review Department  ATTENTION: Please call with any questions or concerns to [phone] and carefully listen to the prompts so that you are directed to the right person. All voicemails are confidential.   For Discharge needs, contact Care Management DC Support Team at [phone] opt. [phone]  Send all requests for admission clinical reviews, approved or denied determinations and any other requests to dedicated fax number below belonging to the campus where the patient is receiving treatment. List of dedicated fax numbers for the Facilities:  FACILITY NAME UR FAX NUMBER   ADMISSION DENIALS (Administrative/Medical Necessity) [phone]   DISCHARGE SUPPORT TEAM (NETWORK) [phone]   PARENT CHILD HEALTH (Maternity/NICU/Pediatrics) [phone]   [facility] [phone]   General acute hospital [phone]   [facility] [phone]   [facility] [phone]   [facility] [phone]   [facility] [phone]   [facility] [phone]   [facility] [phone]   [facility] [phone]   [facility]  [facility] [phone]   [facility] [phone]   [facility] [phone]

## 2025-06-28 NOTE — PLAN OF CARE
Problem: PAIN - ADULT  Goal: Verbalizes/displays adequate comfort level or baseline comfort level  Description: Interventions:  - Encourage patient to monitor pain and request assistance  - Assess pain using appropriate pain scale  - Administer analgesics as ordered based on type and severity of pain and evaluate response  - Implement non-pharmacological measures as appropriate and evaluate response  - Consider cultural and social influences on pain and pain management  - Notify physician/advanced practitioner if interventions unsuccessful or patient reports new pain  - Educate patient/family on pain management process including their role and importance of  reporting pain   - Provide non-pharmacologic/complimentary pain relief interventions  Outcome: Progressing     Problem: INFECTION - ADULT  Goal: Absence or prevention of progression during hospitalization  Description: INTERVENTIONS:  - Assess and monitor for signs and symptoms of infection  - Monitor lab/diagnostic results  - Monitor all insertion sites, i.e. indwelling lines, tubes, and drains  - Monitor endotracheal if appropriate and nasal secretions for changes in amount and color  - [place] appropriate cooling/warming therapies per order  - Administer medications as ordered  - Instruct and encourage patient and family to use good hand hygiene technique  - Identify and instruct in appropriate isolation precautions for identified infection/condition  Outcome: Progressing  Goal: Absence of fever/infection during neutropenic period  Description: INTERVENTIONS:  - Monitor WBC  - Perform strict hand hygiene  - Limit to healthy visitors only  - No plants, dried, fresh or silk flowers with yeh in patient room  Outcome: Progressing     Problem: SAFETY ADULT  Goal: Patient will remain free of falls  Description: INTERVENTIONS:  - Educate patient/family on patient safety including physical limitations  - Instruct patient to call for assistance with activity   -  Consider consulting OT/PT to assist with strengthening/mobility based on AM PAC & JH-HLM score  - Consult OT/PT to assist with strengthening/mobility   - Keep Call bell within reach  - Keep bed low and locked with side rails adjusted as appropriate  - Keep care items and personal belongings within reach  - Initiate and maintain comfort rounds  - Make Fall Risk Sign visible to staff  - Apply yellow socks and bracelet for high fall risk patients  - Consider moving patient to room near nurses station  Outcome: Progressing  Goal: Maintain or return to baseline ADL function  Description: INTERVENTIONS:  -  Assess patient's ability to carry out ADLs; assess patient's baseline for ADL function and identify physical deficits which impact ability to perform ADLs (bathing, care of mouth/teeth, toileting, grooming, dressing, etc.)  - Assess/evaluate cause of self-care deficits   - Assess range of motion  - Assess patient's mobility; develop plan if impaired  - Assess patient's need for assistive devices and provide as appropriate  - Encourage maximum independence but intervene and supervise when necessary  - Involve family in performance of ADLs  - Assess for home care needs following discharge   - Consider OT consult to assist with ADL evaluation and planning for discharge  - Provide patient education as appropriate  - Monitor functional capacity and physical performance, use of AM PAC & JH-HLM   - Monitor gait, balance and fatigue with ambulation    Outcome: Progressing  Goal: Maintains/Returns to pre admission functional level  Description: INTERVENTIONS:  - Perform AM-PAC 6 Click Basic Mobility/ Daily Activity assessment daily.  - Set and communicate daily mobility goal to care team and patient/family/caregiver.   - Collaborate with rehabilitation services on mobility goals if consulted  - Out of bed for toileting  - Record patient progress and toleration of activity level   Outcome: Progressing     Problem: DISCHARGE  PLANNING  Goal: Discharge to home or other facility with appropriate resources  Description: INTERVENTIONS:  - Identify barriers to discharge w/patient and caregiver  - Arrange for needed discharge resources and transportation as appropriate  - Identify discharge learning needs (meds, wound care, etc.)  - Arrange for interpretive services to assist at discharge as needed  - Refer to Case Management Department for coordinating discharge planning if the patient needs post-hospital services based on physician/advanced practitioner order or complex needs related to functional status, cognitive ability, or social support system  Outcome: Progressing     Problem: Knowledge Deficit  Goal: Patient/family/caregiver demonstrates understanding of disease process, treatment plan, medications, and discharge instructions  Description: Complete learning assessment and assess knowledge base.  Interventions:  - Provide teaching at level of understanding  - Provide teaching via preferred learning methods  Outcome: Progressing

## 2025-06-28 NOTE — PROGRESS NOTES
Progress Note - GYN Oncology   Name: Mae Coleman 54 y.o. female I MRN: [id number]  Unit/Bed#: E5 -01 I Date of Admission: 6/27/2025   Date of Service: 6/28/2025 I Hospital Day: 0     Assessment & Plan  Endometrial hyperplasia  Postop day 1 status post robot hysterectomy BSO and mini laparotomy for specimen extraction for EIN    FEN minimal p.o. intake due to oral pharyngeal swelling.  Patient using Chloraseptic and Cepacol lozenges we will advance diet as tolerated.  Pain control stable at present.  Patient ambulating and voiding without difficulty.  I's and O's appropriate.  Cough continue to use a stent of spirometer.  Postoperative blood work shows CBC with white count of 12 and hemoglobin of 12.  All blood work is stable.  Morbid obesity with body mass index (BMI) greater than or equal to 50 ([facility])      GYNECOLOGY  Subjective   Patient had somewhat of a difficult night.  She is out of bed.  Pain is somewhat controlled with oral opioid and IV Toradol.  Level is approximately 5.  Patient having difficulty swallowing due to throat swelling.  Has tried Cepacol and Chloraseptic.  Presently is only able to get down ice chips.    Patient also coughing when doing incentive spirometry.    Objective :  Temp:  [97.2 °F (36.2 °C)-98.4 °F (36.9 °C)] 98.2 °F (36.8 °C)  HR:  [70-80] 72  BP: (108-151)/(53-85) 111/63  Resp:  [16-18] 16  SpO2:  [91 %-99 %] 98 %  O2 Device: Nasal cannula  Nasal Cannula O2 Flow Rate (L/min):  [2 L/min] 2 L/min    Physical Exam  HEENT normocephalic atraumatic  Neck supple without thyromegaly lymphadenopathy  Lungs clear to auscultation percussion,  Cardiac exam regular rate and rhythm  Abdomen soft nontender positive bowel sounds incisions clean dry intact  Extremities without cyanosis, edema    Lab Results: I have reviewed the following results:CBC/BMP:   .     06/28/25  0521   WBC 11.53*   HGB 12.0   HCT 40.1      SODIUM 137   K 4.2      CO2 25   BUN 10   CREATININE 0.66   GLUC  110

## 2025-06-28 NOTE — CONSULTS
Consultation - Otolaryngology - Head and Neck Surgery  Facial Plastic and Reconstructive Surgery  Mae Coleman 54 y.o. 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 Can consider nimodipine as there is clinical data that this can improve neurologic healing after injury. Nimodipine 60 mg every 4 hours for 7-10 days.     Dysphagia and dysphonia: secondary to the left VC paresis. Recommend SLP evaluation with bedside swallow, VBS and barium swallow. Discussed with radiology and will attempt to obtain the eval tomorrow vs Monday. Due to hx of GERD recommend aggressive reflux protection with HOB elevation while sleeping, pantoprazole 40 mg qd or BID, and famotidine qhs.     Over 1/2 of the 60 minute visit was spent in medical-decision making, counseling, and coordination of care. Significant coordination of care to obtain VBS including working with SLP, Radiology, and primary team.       History of Present Illness   Physician Requesting Consult: Mandi Gutierrez DO   Reason for Consult / Principal Problem: swallowing problems postop  HPI: Mae Coleman is a 54 y.o. year old female who presents POD 1 s/p robotic total hysterectomy. She awoke with some voice changes and significant throat clearing. Her family who works as adjunct historians note she has a hx of GERD but is untreated. She coughs with all consistencies of food. No evaluation by SLP.     Review of systems:  10 Point ROS was performed and negative except as above or otherwise noted in the medical record.    Historical Information   Past Medical History[1]  Past Surgical History[2]  Social History   Social History     Substance and Sexual Activity   Alcohol Use Never     Social History     Substance and Sexual Activity   Drug Use Never     Tobacco Use History[3]  Family History: Family History[4]    No current  facility-administered medications on file prior to encounter.     No current outpatient medications on file prior to encounter.       Allergies[5]    Vitals:    06/28/25 0905   BP:    Pulse:    Resp:    Temp:    SpO2: 98%       Physical Exam   Constitutional: Oriented to person, place, and time. Well-developed and well-nourished, no apparent distress, non-toxic appearance. Cooperative, able to hear and answer questions without difficulty.    Voice: Breathy weak voice quality. Poor cough.  Head: Normocephalic, atraumatic.  No scars, masses or lesions.  Face: Symmetric, no edema, no sinus tenderness.  Eyes: Vision grossly intact, extra-ocular movement intact.  Ears: External ears normal. Tympanic membranes intact with intact normal landmarks.  No post-auricular erythema or tenderness.  Nose: Septum intact, nares clear.  Mucosa moist, turbinates well appearing.  No crusting, polyps or discharge evident.  Oral cavity: Dentition intact.  Mucosa moist, lips without lesions or masses.  Tongue mobile, floor of mouth soft and flat.  Hard palate intact.  No masses or lesions.   Oropharynx: Uvula is midline, soft palate intact without lesion or mass.  Oropharyngeal inlet without obstruction.  Tonsils unremarkable.  Posterior pharyngeal wall clear. No masses or lesions.  Salivary glands:  Parotid glands and submandibular glands symmetric, no enlargement or tenderness.  Neck: Normal laryngeal elevation with swallow.  Trachea midline.  No masses or lesions. No palpable adenopathy.  Thyroid: Without tenderness or palpable nodules.  Pulmonary/Chest: Normal effort and rate. No respiratory distress. No stertor or stridor  Musculoskeletal: Normal range of motion.   Neurological: Cranial nerves 2-12 intact.  Skin: Skin is warm and dry.   Psychiatric: Normal mood and affect.    Procedure: Flexible fiberoptic laryngoscopy    Indications: Evaluate areas of the upper aerodigestive tract not seen on physical exam    Procedure in detail:  After informed verbal consent was obtained the nose was anesthetized using 4% lidocaine and neosynephrine spray. After adequate time for anesthesia the scope was guided easily along the nasal cavity floor and into the nasopharynx. The nasopharynx, oropharynx, hypopharynx and larynx were evaluated with the below listed findings. The scope was removed without difficulty and the patient tolerated the procedure well.     Findings: No significant mucoid purulence or polyposis in the nasal cavity. No lesions or masses of the nasopharynx, oropharynx, hypopharynx, or larynx. Airway is widely patent.  Right vocal fold is fully mobile. Left vocal fold is immobile and paramedian. Small glottic gap. No lesions or masses of the subglottis.      Imaging Studies: Results Review Statement: I personally reviewed the following image studies in PACS and associated radiology reports: chest xray. My interpretation of the radiology images/reports is: No significant pneumonia.    Lab Results: I have personally reviewed pertinent lab results.      Records reviewed: Dr. Seymour's records.       Cuba Ndiaye MD  Otolaryngology - Head and Neck Surgery  Facial Plastic and Reconstructive Surgery   of Otolaryngology - Head and Neck Surgery, [facility]  Clinical  of Otolaryngology - Head and Neck Surgery, [facility]         [1]   Past Medical History:  Diagnosis Date    Adult BMI 50.0-59.9 kg/sq m (HCC)     Obesity     Polycystic ovary syndrome    [2]   Past Surgical History:  Procedure Laterality Date    BREAST BIOPSY Left 08/29/2019    benign    BREAST CYST ASPIRATION Left 08/29/2019    benign    CHOLECYSTECTOMY      DE QUERVAIN'S RELEASE      x 2    GALLBLADDER SURGERY      US BREAST CYST ASPIRATION LEFT INITIAL Left 08/29/2019   [3]   Social History  Tobacco Use   Smoking Status Former    Types: Cigarettes   Smokeless Tobacco Never   [4]   Family History  Problem  Relation Name Age of Onset    Hypertension Mother      Stroke Mother      Diabetes Mother      Kidney failure Mother      Diabetes Father      No Known Problems Sister evi     Hypertension Sister namita     Hypothyroidism Sister namita     Cancer Sister Michelle- 1/2 sister         anorectal cancer    Breast cancer Sister Michelle- 1/2 sister         in 40s    No Known Problems Daughter      No Known Problems Maternal Grandmother      No Known Problems Maternal Grandfather      No Known Problems Paternal Grandmother      No Known Problems Paternal Grandfather      No Known Problems Maternal Aunt      No Known Problems Maternal Aunt      No Known Problems Paternal Aunt      No Known Problems Paternal Aunt      Breast cancer Half-Sister michelle 49    Colon cancer Half-Sister michelle 57    Breast cancer additional onset Half-Sister michelle         total 4xs breast cancer    Endometrial cancer Half-Sister ky 54    Pancreatic cancer Half-Sister ky     Kidney failure Half-Sister dina     Substance Abuse Half-Brother      No Known Problems Half-Brother      Ovarian cancer Neg Hx      Uterine cancer Neg Hx     [5] No Known Allergies

## 2025-06-28 NOTE — PLAN OF CARE
Problem: PAIN - ADULT  Goal: Verbalizes/displays adequate comfort level or baseline comfort level  Description: Interventions:  - Encourage patient to monitor pain and request assistance  - Assess pain using appropriate pain scale  - Administer analgesics as ordered based on type and severity of pain and evaluate response  - Implement non-pharmacological measures as appropriate and evaluate response  - Consider cultural and social influences on pain and pain management  - Notify physician/advanced practitioner if interventions unsuccessful or patient reports new pain  - Educate patient/family on pain management process including their role and importance of  reporting pain   - Provide non-pharmacologic/complimentary pain relief interventions  Outcome: Progressing     Problem: INFECTION - ADULT  Goal: Absence or prevention of progression during hospitalization  Description: INTERVENTIONS:  - Assess and monitor for signs and symptoms of infection  - Monitor lab/diagnostic results  - Monitor all insertion sites, i.e. indwelling lines, tubes, and drains  - Monitor endotracheal if appropriate and nasal secretions for changes in amount and color  - [place] appropriate cooling/warming therapies per order  - Administer medications as ordered  - Instruct and encourage patient and family to use good hand hygiene technique  - Identify and instruct in appropriate isolation precautions for identified infection/condition  Outcome: Progressing  Goal: Absence of fever/infection during neutropenic period  Description: INTERVENTIONS:  - Monitor WBC  - Perform strict hand hygiene  - Limit to healthy visitors only  - No plants, dried, fresh or silk flowers with yeh in patient room  Outcome: Progressing     Problem: SAFETY ADULT  Goal: Patient will remain free of falls  Description: INTERVENTIONS:  - Educate patient/family on patient safety including physical limitations  - Instruct patient to call for assistance with activity   -  Consider consulting OT/PT to assist with strengthening/mobility based on AM PAC & JH-HLM score  - Consult OT/PT to assist with strengthening/mobility   - Keep Call bell within reach  - Keep bed low and locked with side rails adjusted as appropriate  - Keep care items and personal belongings within reach  - Initiate and maintain comfort rounds  - Make Fall Risk Sign visible to staff  - Apply yellow socks and bracelet for high fall risk patients  - Consider moving patient to room near nurses station  Outcome: Progressing  Goal: Maintain or return to baseline ADL function  Description: INTERVENTIONS:  -  Assess patient's ability to carry out ADLs; assess patient's baseline for ADL function and identify physical deficits which impact ability to perform ADLs (bathing, care of mouth/teeth, toileting, grooming, dressing, etc.)  - Assess/evaluate cause of self-care deficits   - Assess range of motion  - Assess patient's mobility; develop plan if impaired  - Assess patient's need for assistive devices and provide as appropriate  - Encourage maximum independence but intervene and supervise when necessary  - Involve family in performance of ADLs  - Assess for home care needs following discharge   - Consider OT consult to assist with ADL evaluation and planning for discharge  - Provide patient education as appropriate  - Monitor functional capacity and physical performance, use of AM PAC & JH-HLM   - Monitor gait, balance and fatigue with ambulation    Outcome: Progressing  Goal: Maintains/Returns to pre admission functional level  Description: INTERVENTIONS:  - Perform AM-PAC 6 Click Basic Mobility/ Daily Activity assessment daily.  - Set and communicate daily mobility goal to care team and patient/family/caregiver.   - Collaborate with rehabilitation services on mobility goals if consulted  - Out of bed for toileting  - Record patient progress and toleration of activity level   Outcome: Progressing     Problem: DISCHARGE  PLANNING  Goal: Discharge to home or other facility with appropriate resources  Description: INTERVENTIONS:  - Identify barriers to discharge w/patient and caregiver  - Arrange for needed discharge resources and transportation as appropriate  - Identify discharge learning needs (meds, wound care, etc.)  - Arrange for interpretive services to assist at discharge as needed  - Refer to Case Management Department for coordinating discharge planning if the patient needs post-hospital services based on physician/advanced practitioner order or complex needs related to functional status, cognitive ability, or social support system  Outcome: Progressing     Problem: Knowledge Deficit  Goal: Patient/family/caregiver demonstrates understanding of disease process, treatment plan, medications, and discharge instructions  Description: Complete learning assessment and assess knowledge base.  Interventions:  - Provide teaching at level of understanding  - Provide teaching via preferred learning methods  Outcome: Progressing

## 2025-06-29 ENCOUNTER — APPOINTMENT (INPATIENT)
Dept: RADIOLOGY | Facility: HOSPITAL | Age: 55
DRG: 742 | End: 2025-06-29
Payer: COMMERCIAL

## 2025-06-29 VITALS
WEIGHT: 283.29 LBS | HEIGHT: 61 IN | TEMPERATURE: 98.3 F | OXYGEN SATURATION: 93 % | RESPIRATION RATE: 18 BRPM | BODY MASS INDEX: 53.49 KG/M2 | SYSTOLIC BLOOD PRESSURE: 135 MMHG | DIASTOLIC BLOOD PRESSURE: 69 MMHG | HEART RATE: 62 BPM

## 2025-06-29 LAB
ANION GAP SERPL CALCULATED.3IONS-SCNC: 5 MMOL/L (ref 4–13)
BUN SERPL-MCNC: 10 MG/DL (ref 5–25)
CALCIUM SERPL-MCNC: 8.4 MG/DL (ref 8.4–10.2)
CHLORIDE SERPL-SCNC: 104 MMOL/L (ref 96–108)
CO2 SERPL-SCNC: 31 MMOL/L (ref 21–32)
CREAT SERPL-MCNC: 0.67 MG/DL (ref 0.6–1.3)
ERYTHROCYTE [DISTWIDTH] IN BLOOD BY AUTOMATED COUNT: 13.9 % (ref 11.6–15.1)
GFR SERPL CREATININE-BSD FRML MDRD: 99 ML/MIN/1.73SQ M
GLUCOSE SERPL-MCNC: 85 MG/DL (ref 65–140)
HCT VFR BLD AUTO: 36.2 % (ref 34.8–46.1)
HGB BLD-MCNC: 11.6 G/DL (ref 11.5–15.4)
MCH RBC QN AUTO: 29.4 PG (ref 26.8–34.3)
MCHC RBC AUTO-ENTMCNC: 32 G/DL (ref 31.4–37.4)
MCV RBC AUTO: 92 FL (ref 82–98)
PLATELET # BLD AUTO: 224 THOUSANDS/UL (ref 149–390)
PMV BLD AUTO: 10.8 FL (ref 8.9–12.7)
POTASSIUM SERPL-SCNC: 3.6 MMOL/L (ref 3.5–5.3)
RBC # BLD AUTO: 3.94 MILLION/UL (ref 3.81–5.12)
SODIUM SERPL-SCNC: 140 MMOL/L (ref 135–147)
WBC # BLD AUTO: 7.22 THOUSAND/UL (ref 4.31–10.16)

## 2025-06-29 PROCEDURE — 99024 POSTOP FOLLOW-UP VISIT: CPT | Performed by: STUDENT IN AN ORGANIZED HEALTH CARE EDUCATION/TRAINING PROGRAM

## 2025-06-29 PROCEDURE — 80048 BASIC METABOLIC PNL TOTAL CA: CPT

## 2025-06-29 PROCEDURE — 85027 COMPLETE CBC AUTOMATED: CPT

## 2025-06-29 PROCEDURE — 92611 MOTION FLUOROSCOPY/SWALLOW: CPT | Performed by: SPEECH-LANGUAGE PATHOLOGIST

## 2025-06-29 PROCEDURE — 74230 X-RAY XM SWLNG FUNCJ C+: CPT

## 2025-06-29 RX ORDER — NIMODIPINE 30 MG/1
60 CAPSULE, LIQUID FILLED ORAL
Status: DISCONTINUED | OUTPATIENT
Start: 2025-06-29 | End: 2025-06-29

## 2025-06-29 RX ORDER — ACETAMINOPHEN 325 MG/1
975 TABLET ORAL EVERY 8 HOURS SCHEDULED
Status: DISCONTINUED | OUTPATIENT
Start: 2025-06-29 | End: 2025-06-30 | Stop reason: HOSPADM

## 2025-06-29 RX ORDER — FAMOTIDINE 10 MG/ML
20 INJECTION, SOLUTION INTRAVENOUS
Status: DISCONTINUED | OUTPATIENT
Start: 2025-06-29 | End: 2025-06-30 | Stop reason: HOSPADM

## 2025-06-29 RX ORDER — OXYCODONE HYDROCHLORIDE 5 MG/1
5 TABLET ORAL EVERY 4 HOURS PRN
Refills: 0 | Status: DISCONTINUED | OUTPATIENT
Start: 2025-06-29 | End: 2025-06-30 | Stop reason: HOSPADM

## 2025-06-29 RX ORDER — IBUPROFEN 600 MG/1
600 TABLET, FILM COATED ORAL EVERY 6 HOURS PRN
Status: DISCONTINUED | OUTPATIENT
Start: 2025-06-29 | End: 2025-06-30 | Stop reason: HOSPADM

## 2025-06-29 RX ADMIN — LIDOCAINE 1 PATCH: 700 PATCH TOPICAL at 14:19

## 2025-06-29 RX ADMIN — ENOXAPARIN SODIUM 60 MG: 100 INJECTION SUBCUTANEOUS at 21:19

## 2025-06-29 RX ADMIN — ACETAMINOPHEN 1000 MG: 10 INJECTION INTRAVENOUS at 05:37

## 2025-06-29 RX ADMIN — SODIUM CHLORIDE, SODIUM LACTATE, POTASSIUM CHLORIDE, AND CALCIUM CHLORIDE 125 ML/HR: .6; .31; .03; .02 INJECTION, SOLUTION INTRAVENOUS at 03:56

## 2025-06-29 RX ADMIN — ACETAMINOPHEN 1000 MG: 10 INJECTION INTRAVENOUS at 00:20

## 2025-06-29 RX ADMIN — DOCUSATE SODIUM 100 MG: 100 CAPSULE, LIQUID FILLED ORAL at 08:53

## 2025-06-29 RX ADMIN — PHENAZOPYRIDINE 100 MG: 100 TABLET ORAL at 17:15

## 2025-06-29 RX ADMIN — POLYETHYLENE GLYCOL 3350 17 G: 17 POWDER, FOR SOLUTION ORAL at 08:52

## 2025-06-29 RX ADMIN — ENOXAPARIN SODIUM 60 MG: 100 INJECTION SUBCUTANEOUS at 08:53

## 2025-06-29 RX ADMIN — FAMOTIDINE 20 MG: 10 INJECTION, SOLUTION INTRAVENOUS at 21:19

## 2025-06-29 RX ADMIN — DOCUSATE SODIUM 100 MG: 100 CAPSULE, LIQUID FILLED ORAL at 17:15

## 2025-06-29 RX ADMIN — ACETAMINOPHEN 1000 MG: 10 INJECTION INTRAVENOUS at 12:17

## 2025-06-29 RX ADMIN — IBUPROFEN 600 MG: 600 TABLET, FILM COATED ORAL at 17:17

## 2025-06-29 RX ADMIN — SODIUM CHLORIDE, SODIUM LACTATE, POTASSIUM CHLORIDE, AND CALCIUM CHLORIDE 125 ML/HR: .6; .31; .03; .02 INJECTION, SOLUTION INTRAVENOUS at 12:18

## 2025-06-29 RX ADMIN — PHENAZOPYRIDINE 100 MG: 100 TABLET ORAL at 12:18

## 2025-06-29 RX ADMIN — PHENAZOPYRIDINE 100 MG: 100 TABLET ORAL at 08:52

## 2025-06-29 RX ADMIN — KETOROLAC TROMETHAMINE 15 MG: 30 INJECTION, SOLUTION INTRAMUSCULAR; INTRAVENOUS at 12:18

## 2025-06-29 RX ADMIN — ACETAMINOPHEN 975 MG: 325 TABLET ORAL at 19:41

## 2025-06-29 NOTE — ASSESSMENT & PLAN NOTE
54 y.o. POD2 status post robot hysterectomy BSO and mini laparotomy for specimen extraction for EIN, postop course c/b partial vocal cord paralysis, clinically improving.     FEN: NPO pending speech/swallow eval. Patient using Chloraseptic and Cepacol lozenges. We will advance diet as tolerated.  Pain control stable at present with IV tylenol/toradol, lidocaine patches.   Patient ambulating and voiding without difficulty.  I's and O's appropriate.  Continue to incentive spirometer.  Reviewed dc instructions in detail in case of possible dc today.     Dispo: pending speech / swallow eval and subsequent ENT / recommendations; if unable to tolerate PO pills, will require continued inpatient admission.

## 2025-06-29 NOTE — PLAN OF CARE
Problem: PAIN - ADULT  Goal: Verbalizes/displays adequate comfort level or baseline comfort level  Description: Interventions:  - Encourage patient to monitor pain and request assistance  - Assess pain using appropriate pain scale  - Administer analgesics as ordered based on type and severity of pain and evaluate response  - Implement non-pharmacological measures as appropriate and evaluate response  - Consider cultural and social influences on pain and pain management  - Notify physician/advanced practitioner if interventions unsuccessful or patient reports new pain  - Educate patient/family on pain management process including their role and importance of  reporting pain   - Provide non-pharmacologic/complimentary pain relief interventions  Outcome: Progressing     Problem: INFECTION - ADULT  Goal: Absence or prevention of progression during hospitalization  Description: INTERVENTIONS:  - Assess and monitor for signs and symptoms of infection  - Monitor lab/diagnostic results  - Monitor all insertion sites, i.e. indwelling lines, tubes, and drains  - Monitor endotracheal if appropriate and nasal secretions for changes in amount and color  - [place] appropriate cooling/warming therapies per order  - Administer medications as ordered  - Instruct and encourage patient and family to use good hand hygiene technique  - Identify and instruct in appropriate isolation precautions for identified infection/condition  Outcome: Progressing  Goal: Absence of fever/infection during neutropenic period  Description: INTERVENTIONS:  - Monitor WBC  - Perform strict hand hygiene  - Limit to healthy visitors only  - No plants, dried, fresh or silk flowers with yeh in patient room  Outcome: Progressing     Problem: SAFETY ADULT  Goal: Patient will remain free of falls  Description: INTERVENTIONS:  - Educate patient/family on patient safety including physical limitations  - Instruct patient to call for assistance with activity   -  Consider consulting OT/PT to assist with strengthening/mobility based on AM PAC & JH-HLM score  - Consult OT/PT to assist with strengthening/mobility   - Keep Call bell within reach  - Keep bed low and locked with side rails adjusted as appropriate  - Keep care items and personal belongings within reach  - Initiate and maintain comfort rounds  - Make Fall Risk Sign visible to staff  - Apply yellow socks and bracelet for high fall risk patients  - Consider moving patient to room near nurses station  Outcome: Progressing  Goal: Maintain or return to baseline ADL function  Description: INTERVENTIONS:  -  Assess patient's ability to carry out ADLs; assess patient's baseline for ADL function and identify physical deficits which impact ability to perform ADLs (bathing, care of mouth/teeth, toileting, grooming, dressing, etc.)  - Assess/evaluate cause of self-care deficits   - Assess range of motion  - Assess patient's mobility; develop plan if impaired  - Assess patient's need for assistive devices and provide as appropriate  - Encourage maximum independence but intervene and supervise when necessary  - Involve family in performance of ADLs  - Assess for home care needs following discharge   - Consider OT consult to assist with ADL evaluation and planning for discharge  - Provide patient education as appropriate  - Monitor functional capacity and physical performance, use of AM PAC & JH-HLM   - Monitor gait, balance and fatigue with ambulation    Outcome: Progressing  Goal: Maintains/Returns to pre admission functional level  Description: INTERVENTIONS:  - Perform AM-PAC 6 Click Basic Mobility/ Daily Activity assessment daily.  - Set and communicate daily mobility goal to care team and patient/family/caregiver.   - Collaborate with rehabilitation services on mobility goals if consulted  - Out of bed for toileting  - Record patient progress and toleration of activity level   Outcome: Progressing     Problem: DISCHARGE  PLANNING  Goal: Discharge to home or other facility with appropriate resources  Description: INTERVENTIONS:  - Identify barriers to discharge w/patient and caregiver  - Arrange for needed discharge resources and transportation as appropriate  - Identify discharge learning needs (meds, wound care, etc.)  - Arrange for interpretive services to assist at discharge as needed  - Refer to Case Management Department for coordinating discharge planning if the patient needs post-hospital services based on physician/advanced practitioner order or complex needs related to functional status, cognitive ability, or social support system  Outcome: Progressing     Problem: Knowledge Deficit  Goal: Patient/family/caregiver demonstrates understanding of disease process, treatment plan, medications, and discharge instructions  Description: Complete learning assessment and assess knowledge base.  Interventions:  - Provide teaching at level of understanding  - Provide teaching via preferred learning methods  Outcome: Progressing

## 2025-06-29 NOTE — PLAN OF CARE
Problem: PAIN - ADULT  Goal: Verbalizes/displays adequate comfort level or baseline comfort level  Description: Interventions:  - Encourage patient to monitor pain and request assistance  - Assess pain using appropriate pain scale  - Administer analgesics as ordered based on type and severity of pain and evaluate response  - Implement non-pharmacological measures as appropriate and evaluate response  - Consider cultural and social influences on pain and pain management  - Notify physician/advanced practitioner if interventions unsuccessful or patient reports new pain  - Educate patient/family on pain management process including their role and importance of  reporting pain   - Provide non-pharmacologic/complimentary pain relief interventions  6/29/2025 1046 by Arianna Maldonado RN  Outcome: Progressing  6/29/2025 1046 by Arianna Maldonado RN  Outcome: Progressing     Problem: INFECTION - ADULT  Goal: Absence or prevention of progression during hospitalization  Description: INTERVENTIONS:  - Assess and monitor for signs and symptoms of infection  - Monitor lab/diagnostic results  - Monitor all insertion sites, i.e. indwelling lines, tubes, and drains  - Monitor endotracheal if appropriate and nasal secretions for changes in amount and color  - [place] appropriate cooling/warming therapies per order  - Administer medications as ordered  - Instruct and encourage patient and family to use good hand hygiene technique  - Identify and instruct in appropriate isolation precautions for identified infection/condition  6/29/2025 1046 by Arianna Maldonado RN  Outcome: Progressing  6/29/2025 1046 by Arianna Maldonado RN  Outcome: Progressing  Goal: Absence of fever/infection during neutropenic period  Description: INTERVENTIONS:  - Monitor WBC  - Perform strict hand hygiene  - Limit to healthy visitors only  - No plants, dried, fresh or silk flowers with yeh in patient room  6/29/2025 1046 by Arianna Maldonado RN  Outcome:  Progressing  6/29/2025 1046 by Arianna Maldonado RN  Outcome: Progressing     Problem: SAFETY ADULT  Goal: Patient will remain free of falls  Description: INTERVENTIONS:  - Educate patient/family on patient safety including physical limitations  - Instruct patient to call for assistance with activity   - Consider consulting OT/PT to assist with strengthening/mobility based on AM PAC & JH-HLM score  - Consult OT/PT to assist with strengthening/mobility   - Keep Call bell within reach  - Keep bed low and locked with side rails adjusted as appropriate  - Keep care items and personal belongings within reach  - Initiate and maintain comfort rounds  - Make Fall Risk Sign visible to staff  - Apply yellow socks and bracelet for high fall risk patients  - Consider moving patient to room near nurses station  6/29/2025 1046 by Arianna Maldonado RN  Outcome: Progressing  6/29/2025 1046 by Arianna Maldonado RN  Outcome: Progressing  Goal: Maintain or return to baseline ADL function  Description: INTERVENTIONS:  -  Assess patient's ability to carry out ADLs; assess patient's baseline for ADL function and identify physical deficits which impact ability to perform ADLs (bathing, care of mouth/teeth, toileting, grooming, dressing, etc.)  - Assess/evaluate cause of self-care deficits   - Assess range of motion  - Assess patient's mobility; develop plan if impaired  - Assess patient's need for assistive devices and provide as appropriate  - Encourage maximum independence but intervene and supervise when necessary  - Involve family in performance of ADLs  - Assess for home care needs following discharge   - Consider OT consult to assist with ADL evaluation and planning for discharge  - Provide patient education as appropriate  - Monitor functional capacity and physical performance, use of AM PAC & JH-HLM   - Monitor gait, balance and fatigue with ambulation    6/29/2025 1046 by Arianna Maldonado RN  Outcome: Progressing  6/29/2025 1046  by Arianna Maldonado RN  Outcome: Progressing  Goal: Maintains/Returns to pre admission functional level  Description: INTERVENTIONS:  - Perform AM-PAC 6 Click Basic Mobility/ Daily Activity assessment daily.  - Set and communicate daily mobility goal to care team and patient/family/caregiver.   - Collaborate with rehabilitation services on mobility goals if consulted  - Out of bed for toileting  - Record patient progress and toleration of activity level   6/29/2025 1046 by Arianna Maldonado RN  Outcome: Progressing  6/29/2025 1046 by Arianna Maldonado RN  Outcome: Progressing     Problem: DISCHARGE PLANNING  Goal: Discharge to home or other facility with appropriate resources  Description: INTERVENTIONS:  - Identify barriers to discharge w/patient and caregiver  - Arrange for needed discharge resources and transportation as appropriate  - Identify discharge learning needs (meds, wound care, etc.)  - Arrange for interpretive services to assist at discharge as needed  - Refer to Case Management Department for coordinating discharge planning if the patient needs post-hospital services based on physician/advanced practitioner order or complex needs related to functional status, cognitive ability, or social support system  6/29/2025 1046 by Arianna Maldonado RN  Outcome: Progressing  6/29/2025 1046 by Arianna Maldonado RN  Outcome: Progressing     Problem: Knowledge Deficit  Goal: Patient/family/caregiver demonstrates understanding of disease process, treatment plan, medications, and discharge instructions  Description: Complete learning assessment and assess knowledge base.  Interventions:  - Provide teaching at level of understanding  - Provide teaching via preferred learning methods  6/29/2025 1046 by Arianna Maldonado RN  Outcome: Progressing  6/29/2025 1046 by Arianna Maldonado RN  Outcome: Progressing

## 2025-06-29 NOTE — PROCEDURES
"                                     Speech-Language Pathology Video Barium Swallow Study        Patient Name: Mae Coleman    Today's Date: 6/29/2025     Problem List  Problem List[1]    Past Medical History  Past Medical History[2]    Past Surgical History  Past Surgical History[3]      General Information;  Mae Coleman is a 54 y.o. year old female who presents POD 1 s/p robotic total hysterectomy. She awoke with some voice changes and significant throat clearing. Her family who works as adjunct historians note she has a hx of GERD but is untreated. She coughs with all consistencies of food.    VFSS was recommended to further evaluate. The patient reports constant \"tickle\" in her throat and fear of choking.       Speech/Swallow Mech:   Facial: symmetrical  Labial: WFL  Lingual: WFL  Velum: symmetrical  Mandible: adequate ROM  Dentition: adequate  Vocal quality:dysphonic   Volitional Cough: strong/productive   Respiratory: RA    Previous VBS: none    Consistencies Assessed and Performance   Pt was seen in radiology for a Video Barium Swallow Study, seated in the upright position and viewed laterally with the following consistencies:     Administered: thin liquids, nectar thick, puree, and hard solids  Specific materials administered: Barium laden pudding, cookie, nectar thick, thin liquids, 13mm barium pill. Liquids were administered by cup and straw.    Results are as follows:     **Images are available for review on PACS    Oral stage:  Lip closure: adequate  Mastication: adequate  Bolus formation: adequate  Bolus control:adequate  Transfer: deliberately slow but adequate  Residue: -    Pharyngeal stage:  Swallow promptness: prompt  Spill to valleculae:-  Spill to pyriforms: -  Epiglottic inversion: adequate  Laryngeal rise: adequate  Pharyngeal constriction: adequate  Vallecular retention: -  Pyriform retention: -  PPW coating: -  CP prominence: not directly observed but visualization was somewhat limited by " body habitus  Retropulsion from prominence: x1 episode of retching/retrograde flow noted which pt independently reswallowed  Transient penetration: -  Epiglottic undercoat:-  Penetration: -  Aspiration: -    Screening of Esophageal stage:  Brief hold up of barium tablet noted in distal esophagus. Mild retention noted which eventually cleared. This is not a formal assessment of the esophagus and limited to small amounts in the upright position.     Penetration/Aspiration:  Thin: PAS - 1  Nectar: PAS- 1  Honey: PAS-DNT  Puree: PAS 1  Solid: PAS1  Response to Aspiration: N/A  Strategies/Efficacy: N/A           8-Point Penetration-Aspiration Scale   1 Material does not enter the airway   2 Material enters the airway, remains above the vocal folds, and is ejected  from the  airway    3 Material enters the airway, remains above the vocal folds, and is not ejected from the airway   4 Material enters the airway, contacts the vocal folds, and is ejected from the airway   5 Material enters the airway, contacts the vocal folds, and is not ejected from the airway    6 Material enters the airway, passes below the vocal folds and is ejected into the larynx or out of the airway    7 Material enters the airway, passes below the vocal folds, and is not ejected from the trachea despite effort    8 Material enters the airway, passes below the vocal folds, and no effort is made to eject            Assessment Summary;  Pts oropharyngeal swallow function appears generally WFL at this time with the materials administered today. She did present with coughing on today's study however this was in the absence of any po in/near the airway and unrelated to swallowing. She does not appear to be at risk for anterograde aspiration. Cannot r/o retrograde aspiration risk given hx of GERD and episodes of regurgitation today.     Suspect GERD/LPR component and may consider laryngeal hypersensitivity contributing to cough.   F/u with ENT/laryngology in  the outpatient setting if sxs persist.     The patient was educated on strategies to reduce this. Reciprocal comprehension was verbally expressed.      Continue with aggressive reflux management per ENT.      Recommendations;  Recommend regular diet and thin liquids, with upright posture, slow rate of feeding, and small bites/sips.   Recommended Form of Meds: as desired/tolerated     Aspiration precautions   Reflux Precautions    Results reviewed with patient, MD, and ENT.      No further SLP services indicated at this time.      Majo Syed M.S., CCC-SLP  Speech Language Pathologist   Available via Secure Chat  [place] #[id number]  [place] #[id number]         [1]   Patient Active Problem List  Diagnosis    Morbid obesity with body mass index (BMI) greater than or equal to 50 ([facility])    Vitamin D deficiency    Family history of hypothyroidism    Annual physical exam    Excessive bleeding in premenopausal period    Uninsured    Acute pain of left knee    Extensor tendinitis of hand    Endometrial hyperplasia    Polycystic ovary syndrome    Paresis of left vocal cord    Dysphagia, unspecified    Dysphonia    Gastroesophageal reflux disease   [2]   Past Medical History:  Diagnosis Date    Adult BMI 50.0-59.9 kg/sq m (HCC)     Obesity     Polycystic ovary syndrome    [3]   Past Surgical History:  Procedure Laterality Date    BREAST BIOPSY Left 08/29/2019    benign    BREAST CYST ASPIRATION Left 08/29/2019    benign    CHOLECYSTECTOMY      DE QUERVAIN'S RELEASE      x 2    GALLBLADDER SURGERY      US BREAST CYST ASPIRATION LEFT INITIAL Left 08/29/2019

## 2025-06-29 NOTE — PLAN OF CARE
Problem: PAIN - ADULT  Goal: Verbalizes/displays adequate comfort level or baseline comfort level  Description: Interventions:  - Encourage patient to monitor pain and request assistance  - Assess pain using appropriate pain scale  - Administer analgesics as ordered based on type and severity of pain and evaluate response  - Implement non-pharmacological measures as appropriate and evaluate response  - Consider cultural and social influences on pain and pain management  - Notify physician/advanced practitioner if interventions unsuccessful or patient reports new pain  - Educate patient/family on pain management process including their role and importance of  reporting pain   - Provide non-pharmacologic/complimentary pain relief interventions  Outcome: Progressing     Problem: INFECTION - ADULT  Goal: Absence or prevention of progression during hospitalization  Description: INTERVENTIONS:  - Assess and monitor for signs and symptoms of infection  - Monitor lab/diagnostic results  - Monitor all insertion sites, i.e. indwelling lines, tubes, and drains  - Monitor endotracheal if appropriate and nasal secretions for changes in amount and color  - [place] appropriate cooling/warming therapies per order  - Administer medications as ordered  - Instruct and encourage patient and family to use good hand hygiene technique  - Identify and instruct in appropriate isolation precautions for identified infection/condition  Outcome: Progressing  Goal: Absence of fever/infection during neutropenic period  Description: INTERVENTIONS:  - Monitor WBC  - Perform strict hand hygiene  - Limit to healthy visitors only  - No plants, dried, fresh or silk flowers with yeh in patient room  Outcome: Progressing     Problem: SAFETY ADULT  Goal: Patient will remain free of falls  Description: INTERVENTIONS:  - Educate patient/family on patient safety including physical limitations  - Instruct patient to call for assistance with activity   -  Consider consulting OT/PT to assist with strengthening/mobility based on AM PAC & JH-HLM score  - Consult OT/PT to assist with strengthening/mobility   - Keep Call bell within reach  - Keep bed low and locked with side rails adjusted as appropriate  - Keep care items and personal belongings within reach  - Initiate and maintain comfort rounds  - Make Fall Risk Sign visible to staff  - Apply yellow socks and bracelet for high fall risk patients  - Consider moving patient to room near nurses station  Outcome: Progressing  Goal: Maintain or return to baseline ADL function  Description: INTERVENTIONS:  -  Assess patient's ability to carry out ADLs; assess patient's baseline for ADL function and identify physical deficits which impact ability to perform ADLs (bathing, care of mouth/teeth, toileting, grooming, dressing, etc.)  - Assess/evaluate cause of self-care deficits   - Assess range of motion  - Assess patient's mobility; develop plan if impaired  - Assess patient's need for assistive devices and provide as appropriate  - Encourage maximum independence but intervene and supervise when necessary  - Involve family in performance of ADLs  - Assess for home care needs following discharge   - Consider OT consult to assist with ADL evaluation and planning for discharge  - Provide patient education as appropriate  - Monitor functional capacity and physical performance, use of AM PAC & JH-HLM   - Monitor gait, balance and fatigue with ambulation    Outcome: Progressing  Goal: Maintains/Returns to pre admission functional level  Description: INTERVENTIONS:  - Perform AM-PAC 6 Click Basic Mobility/ Daily Activity assessment daily.  - Set and communicate daily mobility goal to care team and patient/family/caregiver.   - Collaborate with rehabilitation services on mobility goals if consulted  - Out of bed for toileting  - Record patient progress and toleration of activity level   Outcome: Progressing     Problem: DISCHARGE  PLANNING  Goal: Discharge to home or other facility with appropriate resources  Description: INTERVENTIONS:  - Identify barriers to discharge w/patient and caregiver  - Arrange for needed discharge resources and transportation as appropriate  - Identify discharge learning needs (meds, wound care, etc.)  - Arrange for interpretive services to assist at discharge as needed  - Refer to Case Management Department for coordinating discharge planning if the patient needs post-hospital services based on physician/advanced practitioner order or complex needs related to functional status, cognitive ability, or social support system  Outcome: Progressing     Problem: Knowledge Deficit  Goal: Patient/family/caregiver demonstrates understanding of disease process, treatment plan, medications, and discharge instructions  Description: Complete learning assessment and assess knowledge base.  Interventions:  - Provide teaching at level of understanding  - Provide teaching via preferred learning methods  Outcome: Progressing

## 2025-06-29 NOTE — PROGRESS NOTES
Progress Note - GYN Oncology   Name: Mae Coleman 54 y.o. female I MRN: [id number]  Unit/Bed#: E5 -01 I Date of Admission: 6/27/2025   Date of Service: 6/29/2025 I Hospital Day: 1    Assessment & Plan  Endometrial hyperplasia  54 y.o. POD2 status post robot hysterectomy BSO and mini laparotomy for specimen extraction for EIN, postop course c/b partial vocal cord paralysis, clinically improving.     FEN: NPO pending speech/swallow eval. Patient using Chloraseptic and Cepacol lozenges. We will advance diet as tolerated.  Pain control stable at present with IV tylenol/toradol, lidocaine patches.   Patient ambulating and voiding without difficulty.  I's and O's appropriate.  Continue to incentive spirometer.  Reviewed dc instructions in detail in case of possible dc today.     Dispo: pending speech / swallow eval and subsequent ENT / recommendations; if unable to tolerate PO pills, will require continued inpatient admission.   Morbid obesity with body mass index (BMI) greater than or equal to 50 (HCC)    Paresis of left vocal cord  Appreciate ENT recs; nimodipine ordered 7-10d  F/u speech/ swallow eval  Dysphagia, unspecified    Dysphonia    Gastroesophageal reflux disease  Pantoprazole, famotidine ordered     24 Hour Events : no acute events overnight; ENT consult and recs noted   Subjective : Patient reports she is able to speak more comfortably today without coughing.  She noted 1 episode of severe pelvic cramping with voiding today.  She does note urinary urgency however dysuria has resolved.  She is passing flatus.  She is ambulating.  She is eager for discharge home if she is able to tolerate p.o. pain medications.    Objective :  Temp:  [98.2 °F (36.8 °C)-98.6 °F (37 °C)] 98.6 °F (37 °C)  HR:  [58-65] 63  BP: (123-153)/(68-85) 123/68  Resp:  [16-20] 16  SpO2:  [93 %-98 %] 93 %  O2 Device: Nasal cannula  Nasal Cannula O2 Flow Rate (L/min):  [2 L/min] 2 L/min    Physical Exam  Constitutional:       General:  She is not in acute distress.     Appearance: Normal appearance. She is obese.   HENT:      Head: Normocephalic and atraumatic.   Pulmonary:      Effort: Pulmonary effort is normal. No respiratory distress.   Abdominal:      General: There is no distension.      Palpations: Abdomen is soft.      Comments: Robotic port sites with Exofin.  Vertical midline mini laparotomy with Mepilex in place, c/d/i.     Musculoskeletal:      Right lower leg: No edema.      Left lower leg: No edema.     Skin:     General: Skin is warm and dry.     Neurological:      General: No focal deficit present.      Mental Status: She is alert and oriented to person, place, and time.     Psychiatric:         Mood and Affect: Mood normal.         Behavior: Behavior normal.         Lab Results: I have reviewed the following results:CBC/BMP:   .     06/29/25  0424   WBC 7.22   HGB 11.6   HCT 36.2      SODIUM 140   K 3.6      CO2 31   BUN 10   CREATININE 0.67   GLUC 85        Imaging Results Review: No pertinent imaging studies reviewed.  Other Study Results Review: No additional pertinent studies reviewed.    VTE Pharmacologic Prophylaxis: VTE covered by:  enoxaparin, Subcutaneous, 60 mg at 06/29/25 0853     VTE Mechanical Prophylaxis: sequential compression device    I personally saw/examined the patient on 6/29/2025 as a fellow.  This is not a billable service.

## 2025-06-30 ENCOUNTER — APPOINTMENT (INPATIENT)
Dept: RADIOLOGY | Facility: HOSPITAL | Age: 55
DRG: 742 | End: 2025-06-30
Payer: COMMERCIAL

## 2025-06-30 VITALS
RESPIRATION RATE: 17 BRPM | WEIGHT: 283.29 LBS | TEMPERATURE: 97.7 F | DIASTOLIC BLOOD PRESSURE: 91 MMHG | HEIGHT: 61 IN | OXYGEN SATURATION: 94 % | SYSTOLIC BLOOD PRESSURE: 158 MMHG | HEART RATE: 60 BPM | BODY MASS INDEX: 53.49 KG/M2

## 2025-06-30 DIAGNOSIS — N85.00 ENDOMETRIAL HYPERPLASIA: ICD-10-CM

## 2025-06-30 LAB
ANION GAP SERPL CALCULATED.3IONS-SCNC: 6 MMOL/L (ref 4–13)
ATRIAL RATE: 64 BPM
BUN SERPL-MCNC: 9 MG/DL (ref 5–25)
CALCIUM SERPL-MCNC: 8.3 MG/DL (ref 8.4–10.2)
CHLORIDE SERPL-SCNC: 106 MMOL/L (ref 96–108)
CO2 SERPL-SCNC: 30 MMOL/L (ref 21–32)
CREAT SERPL-MCNC: 0.62 MG/DL (ref 0.6–1.3)
ERYTHROCYTE [DISTWIDTH] IN BLOOD BY AUTOMATED COUNT: 13.6 % (ref 11.6–15.1)
GFR SERPL CREATININE-BSD FRML MDRD: 101 ML/MIN/1.73SQ M
GLUCOSE SERPL-MCNC: 91 MG/DL (ref 65–140)
HCT VFR BLD AUTO: 36.6 % (ref 34.8–46.1)
HGB BLD-MCNC: 11.5 G/DL (ref 11.5–15.4)
MCH RBC QN AUTO: 29.5 PG (ref 26.8–34.3)
MCHC RBC AUTO-ENTMCNC: 31.4 G/DL (ref 31.4–37.4)
MCV RBC AUTO: 94 FL (ref 82–98)
P AXIS: 37 DEGREES
PLATELET # BLD AUTO: 214 THOUSANDS/UL (ref 149–390)
PMV BLD AUTO: 10.7 FL (ref 8.9–12.7)
POTASSIUM SERPL-SCNC: 3.7 MMOL/L (ref 3.5–5.3)
PR INTERVAL: 166 MS
QRS AXIS: 9 DEGREES
QRSD INTERVAL: 98 MS
QT INTERVAL: 440 MS
QTC INTERVAL: 454 MS
RBC # BLD AUTO: 3.9 MILLION/UL (ref 3.81–5.12)
SODIUM SERPL-SCNC: 142 MMOL/L (ref 135–147)
T WAVE AXIS: 30 DEGREES
VENTRICULAR RATE: 64 BPM
WBC # BLD AUTO: 5.8 THOUSAND/UL (ref 4.31–10.16)

## 2025-06-30 PROCEDURE — 99024 POSTOP FOLLOW-UP VISIT: CPT | Performed by: OBSTETRICS & GYNECOLOGY

## 2025-06-30 PROCEDURE — 93010 ELECTROCARDIOGRAM REPORT: CPT

## 2025-06-30 PROCEDURE — NC001 PR NO CHARGE: Performed by: OBSTETRICS & GYNECOLOGY

## 2025-06-30 PROCEDURE — 85027 COMPLETE CBC AUTOMATED: CPT

## 2025-06-30 PROCEDURE — 93005 ELECTROCARDIOGRAM TRACING: CPT

## 2025-06-30 PROCEDURE — 80048 BASIC METABOLIC PNL TOTAL CA: CPT

## 2025-06-30 RX ORDER — NIMODIPINE 30 MG/1
60 CAPSULE, LIQUID FILLED ORAL EVERY 4 HOURS
Qty: 84 CAPSULE | Refills: 0 | Status: SHIPPED | OUTPATIENT
Start: 2025-06-30 | End: 2025-07-01

## 2025-06-30 RX ORDER — LIDOCAINE 50 MG/G
1 PATCH TOPICAL DAILY
Qty: 3 PATCH | Refills: 0 | Status: SHIPPED | OUTPATIENT
Start: 2025-06-30 | End: 2025-06-30

## 2025-06-30 RX ORDER — BENZONATATE 100 MG/1
100 CAPSULE ORAL 3 TIMES DAILY PRN
Qty: 20 CAPSULE | Refills: 0 | Status: SHIPPED | OUTPATIENT
Start: 2025-06-30 | End: 2025-06-30

## 2025-06-30 RX ORDER — POLYETHYLENE GLYCOL 3350 17 G/17G
17 POWDER, FOR SOLUTION ORAL DAILY
Qty: 10 EACH | Refills: 0 | Status: SHIPPED | OUTPATIENT
Start: 2025-06-30 | End: 2025-06-30

## 2025-06-30 RX ORDER — IBUPROFEN 600 MG/1
600 TABLET, FILM COATED ORAL EVERY 6 HOURS PRN
Qty: 30 TABLET | Refills: 0 | Status: SHIPPED | OUTPATIENT
Start: 2025-06-30

## 2025-06-30 RX ORDER — BENZONATATE 100 MG/1
100 CAPSULE ORAL 3 TIMES DAILY PRN
Qty: 20 CAPSULE | Refills: 0 | Status: SHIPPED | OUTPATIENT
Start: 2025-06-30 | End: 2025-07-17

## 2025-06-30 RX ORDER — GABAPENTIN 100 MG/1
CAPSULE ORAL
Qty: 1 CAPSULE | Refills: 0 | OUTPATIENT
Start: 2025-06-30

## 2025-06-30 RX ORDER — PANTOPRAZOLE SODIUM 40 MG/1
40 TABLET, DELAYED RELEASE ORAL
Qty: 14 TABLET | Refills: 0 | Status: SHIPPED | OUTPATIENT
Start: 2025-06-30 | End: 2025-06-30

## 2025-06-30 RX ORDER — NIMODIPINE 30 MG/1
60 CAPSULE, LIQUID FILLED ORAL EVERY 4 HOURS
Qty: 84 CAPSULE | Refills: 0 | Status: SHIPPED | OUTPATIENT
Start: 2025-06-30 | End: 2025-06-30

## 2025-06-30 RX ORDER — POLYETHYLENE GLYCOL 3350 17 G/17G
17 POWDER, FOR SOLUTION ORAL DAILY
Qty: 10 EACH | Refills: 0 | Status: SHIPPED | OUTPATIENT
Start: 2025-06-30

## 2025-06-30 RX ORDER — ACETAMINOPHEN 500 MG
1000 TABLET ORAL EVERY 6 HOURS PRN
Qty: 60 TABLET | Refills: 0 | Status: SHIPPED | OUTPATIENT
Start: 2025-06-30

## 2025-06-30 RX ORDER — PANTOPRAZOLE SODIUM 40 MG/1
40 TABLET, DELAYED RELEASE ORAL
Qty: 14 TABLET | Refills: 0 | Status: SHIPPED | OUTPATIENT
Start: 2025-06-30

## 2025-06-30 RX ADMIN — IBUPROFEN 600 MG: 600 TABLET, FILM COATED ORAL at 01:16

## 2025-06-30 RX ADMIN — DOCUSATE SODIUM 100 MG: 100 CAPSULE, LIQUID FILLED ORAL at 08:09

## 2025-06-30 RX ADMIN — POLYETHYLENE GLYCOL 3350 17 G: 17 POWDER, FOR SOLUTION ORAL at 08:09

## 2025-06-30 RX ADMIN — ENOXAPARIN SODIUM 60 MG: 100 INJECTION SUBCUTANEOUS at 08:09

## 2025-06-30 RX ADMIN — ACETAMINOPHEN 975 MG: 325 TABLET ORAL at 05:17

## 2025-06-30 RX ADMIN — PANTOPRAZOLE SODIUM 40 MG: 40 TABLET, DELAYED RELEASE ORAL at 05:17

## 2025-06-30 RX ADMIN — PHENAZOPYRIDINE 100 MG: 100 TABLET ORAL at 08:09

## 2025-06-30 NOTE — PLAN OF CARE
Problem: PAIN - ADULT  Goal: Verbalizes/displays adequate comfort level or baseline comfort level  Description: Interventions:  - Encourage patient to monitor pain and request assistance  - Assess pain using appropriate pain scale  - Administer analgesics as ordered based on type and severity of pain and evaluate response  - Implement non-pharmacological measures as appropriate and evaluate response  - Consider cultural and social influences on pain and pain management  - Notify physician/advanced practitioner if interventions unsuccessful or patient reports new pain  - Educate patient/family on pain management process including their role and importance of  reporting pain   - Provide non-pharmacologic/complimentary pain relief interventions  Outcome: Progressing     Problem: INFECTION - ADULT  Goal: Absence or prevention of progression during hospitalization  Description: INTERVENTIONS:  - Assess and monitor for signs and symptoms of infection  - Monitor lab/diagnostic results  - Monitor all insertion sites, i.e. indwelling lines, tubes, and drains  - Monitor endotracheal if appropriate and nasal secretions for changes in amount and color  - [place] appropriate cooling/warming therapies per order  - Administer medications as ordered  - Instruct and encourage patient and family to use good hand hygiene technique  - Identify and instruct in appropriate isolation precautions for identified infection/condition  Outcome: Progressing  Goal: Absence of fever/infection during neutropenic period  Description: INTERVENTIONS:  - Monitor WBC  - Perform strict hand hygiene  - Limit to healthy visitors only  - No plants, dried, fresh or silk flowers with yeh in patient room  Outcome: Progressing     Problem: SAFETY ADULT  Goal: Patient will remain free of falls  Description: INTERVENTIONS:  - Educate patient/family on patient safety including physical limitations  - Instruct patient to call for assistance with activity   -  Consider consulting OT/PT to assist with strengthening/mobility based on AM PAC & JH-HLM score  - Consult OT/PT to assist with strengthening/mobility   - Keep Call bell within reach  - Keep bed low and locked with side rails adjusted as appropriate  - Keep care items and personal belongings within reach  - Initiate and maintain comfort rounds  - Make Fall Risk Sign visible to staff  - Apply yellow socks and bracelet for high fall risk patients  - Consider moving patient to room near nurses station  Outcome: Progressing  Goal: Maintain or return to baseline ADL function  Description: INTERVENTIONS:  -  Assess patient's ability to carry out ADLs; assess patient's baseline for ADL function and identify physical deficits which impact ability to perform ADLs (bathing, care of mouth/teeth, toileting, grooming, dressing, etc.)  - Assess/evaluate cause of self-care deficits   - Assess range of motion  - Assess patient's mobility; develop plan if impaired  - Assess patient's need for assistive devices and provide as appropriate  - Encourage maximum independence but intervene and supervise when necessary  - Involve family in performance of ADLs  - Assess for home care needs following discharge   - Consider OT consult to assist with ADL evaluation and planning for discharge  - Provide patient education as appropriate  - Monitor functional capacity and physical performance, use of AM PAC & JH-HLM   - Monitor gait, balance and fatigue with ambulation    Outcome: Progressing  Goal: Maintains/Returns to pre admission functional level  Description: INTERVENTIONS:  - Perform AM-PAC 6 Click Basic Mobility/ Daily Activity assessment daily.  - Set and communicate daily mobility goal to care team and patient/family/caregiver.   - Collaborate with rehabilitation services on mobility goals if consulted  - Out of bed for toileting  - Record patient progress and toleration of activity level   Outcome: Progressing     Problem: DISCHARGE  PLANNING  Goal: Discharge to home or other facility with appropriate resources  Description: INTERVENTIONS:  - Identify barriers to discharge w/patient and caregiver  - Arrange for needed discharge resources and transportation as appropriate  - Identify discharge learning needs (meds, wound care, etc.)  - Arrange for interpretive services to assist at discharge as needed  - Refer to Case Management Department for coordinating discharge planning if the patient needs post-hospital services based on physician/advanced practitioner order or complex needs related to functional status, cognitive ability, or social support system  Outcome: Progressing     Problem: Knowledge Deficit  Goal: Patient/family/caregiver demonstrates understanding of disease process, treatment plan, medications, and discharge instructions  Description: Complete learning assessment and assess knowledge base.  Interventions:  - Provide teaching at level of understanding  - Provide teaching via preferred learning methods  Outcome: Progressing

## 2025-06-30 NOTE — ASSESSMENT & PLAN NOTE
Appreciate ENT recs; nimodipine ordered 7-10d  Speech eval recs regular diet and thin liquids, slow feeding rate, small bites

## 2025-06-30 NOTE — UTILIZATION REVIEW
NOTIFICATION OF ADMISSION DISCHARGE   This is a Notification of Discharge from [facility]. Please be advised that this patient has been discharge from our facility. Below you will find the admission and discharge date and time including the patient’s disposition.   UTILIZATION REVIEW CONTACT:  Utilization Review Assistants  Network Utilization Review Department  Phone: [phone] x carefully listen to the prompts. All voicemails are confidential.  Email: [email or web address]     ADMISSION INFORMATION  PRESENTATION DATE: 6/27/2025  5:31 AM  OBERVATION ADMISSION DATE: 06/27/2025 1709  INPATIENT ADMISSION DATE: 6/28/25  3:14 PM   DISCHARGE DATE: 6/30/2025 10:33 AM   DISPOSITION:Home/Self Care    Network Utilization Review Department  ATTENTION: Please call with any questions or concerns to [phone] and carefully listen to the prompts so that you are directed to the right person. All voicemails are confidential.   For Discharge needs, contact Care Management DC Support Team at [phone] opt. 2  Send all requests for admission clinical reviews, approved or denied determinations and any other requests to dedicated fax number below belonging to the campus where the patient is receiving treatment. List of dedicated fax numbers for the Facilities:  FACILITY NAME UR FAX NUMBER   ADMISSION DENIALS (Administrative/Medical Necessity) [phone]   DISCHARGE SUPPORT TEAM ([facility]) [phone]   PARENT CHILD HEALTH (Maternity/NICU/Pediatrics) [phone]   [facility] [phone]   [facility] [phone]   [facility] [phone]   [facility] [phone]   [facility] [phone]   [facility] [phone]   [facility] [phone]   [facility] [phone]    [facility] [phone]   [facility] [phone]   [facility] [phone]   [facility] [phone]

## 2025-06-30 NOTE — PROGRESS NOTES
Progress Note - GYN Oncology   Name: Mae Coleman 54 y.o. female I MRN: [id number]  Unit/Bed#: E5 -01 I Date of Admission: 6/27/2025   Date of Service: 6/30/2025 I Hospital Day: 2     Assessment & Plan  Endometrial hyperplasia  54 y.o. POD3 status post robot hysterectomy BSO and mini laparotomy for specimen extraction for EIN, postop course c/b partial vocal cord paralysis, clinically improving.     FEN: Regular diet, thin liquids. Patient using Chloraseptic and Cepacol lozenges.  Pain control stable at present with PO tylenol/motrin, prn oxycodone, lidocaine patches.   Patient ambulating and voiding without difficulty.  I's and O's appropriate.  Continue to incentive spirometer.    Dispo: anticipate d/c 6/30  Paresis of left vocal cord  Appreciate ENT recs; nimodipine ordered 7-10d  Speech eval recs regular diet and thin liquids, slow feeding rate, small bites  Dysphagia, unspecified  See above  Dysphonia  See above  Gastroesophageal reflux disease  Pantoprazole, famotidine ordered     GYNECOLOGY  Subjective   Mae Coleman has no current complaints.  Pain is well controlled with oral medications.  Overnight events: none. Patient is currently voiding.  She is ambulating.  Patient is currently passing flatus and has had bowel movement. She is tolerating PO, and denies nausea or vomiting; speech eval cleared her for regular diet. She reports some mild chest wall muscle soreness. Patient denies fever, chills, chest pain, shortness of breath, or calf tenderness.       Objective :  Temp:  [98.3 °F (36.8 °C)-98.7 °F (37.1 °C)] 98.3 °F (36.8 °C)  HR:  [60-74] 74  BP: (123-176)/(68-89) 157/88  Resp:  [16-18] 18  SpO2:  [93 %-97 %] 94 %  O2 Device: None (Room air)    Physical Exam  Vitals and nursing note reviewed.   Constitutional:       General: She is not in acute distress.     Appearance: She is well-developed.   HENT:      Head: Normocephalic and atraumatic.     Eyes:      Conjunctiva/sclera: Conjunctivae normal.        Cardiovascular:      Rate and Rhythm: Normal rate and regular rhythm.      Heart sounds: No murmur heard.  Pulmonary:      Effort: Pulmonary effort is normal. No respiratory distress.      Breath sounds: Normal breath sounds.   Abdominal:      Palpations: Abdomen is soft.      Tenderness: There is no abdominal tenderness.      Comments: Robotic incisions clean, dry, intact, superficial ecchymoses in RLQ     Musculoskeletal:         General: No swelling.      Cervical back: Neck supple.     Skin:     General: Skin is warm and dry.      Capillary Refill: Capillary refill takes less than 2 seconds.     Neurological:      General: No focal deficit present.      Mental Status: She is alert and oriented to person, place, and time. Mental status is at baseline.     Psychiatric:         Mood and Affect: Mood normal.           Lab Results: I have reviewed the following results:CBC/BMP:   .     06/30/25  0438   WBC 5.80   HGB 11.5   HCT 36.6      SODIUM 142   K 3.7      CO2 30   BUN 9   CREATININE 0.62   GLUC 91        Tiffany Zacarias MD  PGY 3, Obstetrics and Gynecology  6/30/2025  5:46 AM

## 2025-06-30 NOTE — ASSESSMENT & PLAN NOTE
54 y.o. POD3 status post robot hysterectomy BSO and mini laparotomy for specimen extraction for EIN, postop course c/b partial vocal cord paralysis, clinically improving.     FEN: Regular diet, thin liquids. Patient using Chloraseptic and Cepacol lozenges.  Pain control stable at present with PO tylenol/motrin, prn oxycodone, lidocaine patches.   Patient ambulating and voiding without difficulty.  I's and O's appropriate.  Continue to incentive spirometer.    Dispo: anticipate d/c 6/30

## 2025-06-30 NOTE — QUICK NOTE
Patient presented with an episode of chest pain, felt on palpation of the thorax. Denies nausea/ vomiting and headache.     Vitals:    06/30/25 0114   BP: 157/88   Pulse: 74   Resp: 18   Temp: 98.3          EKG wnl, patient received a motrin and the pain subsided.     Katja Gutierrez Jr., MD  OB GYN PGY2

## 2025-06-30 NOTE — PLAN OF CARE
Problem: PAIN - ADULT  Goal: Verbalizes/displays adequate comfort level or baseline comfort level  Description: Interventions:  - Encourage patient to monitor pain and request assistance  - Assess pain using appropriate pain scale  - Administer analgesics as ordered based on type and severity of pain and evaluate response  - Implement non-pharmacological measures as appropriate and evaluate response  - Consider cultural and social influences on pain and pain management  - Notify physician/advanced practitioner if interventions unsuccessful or patient reports new pain  - Educate patient/family on pain management process including their role and importance of  reporting pain   - Provide non-pharmacologic/complimentary pain relief interventions  Outcome: Progressing     Problem: INFECTION - ADULT  Goal: Absence or prevention of progression during hospitalization  Description: INTERVENTIONS:  - Assess and monitor for signs and symptoms of infection  - Monitor lab/diagnostic results  - Monitor all insertion sites, i.e. indwelling lines, tubes, and drains  - Monitor endotracheal if appropriate and nasal secretions for changes in amount and color  - [place] appropriate cooling/warming therapies per order  - Administer medications as ordered  - Instruct and encourage patient and family to use good hand hygiene technique  - Identify and instruct in appropriate isolation precautions for identified infection/condition  Outcome: Progressing  Goal: Absence of fever/infection during neutropenic period  Description: INTERVENTIONS:  - Monitor WBC  - Perform strict hand hygiene  - Limit to healthy visitors only  - No plants, dried, fresh or silk flowers with yeh in patient room  Outcome: Progressing     Problem: SAFETY ADULT  Goal: Patient will remain free of falls  Description: INTERVENTIONS:  - Educate patient/family on patient safety including physical limitations  - Instruct patient to call for assistance with activity   -  Consider consulting OT/PT to assist with strengthening/mobility based on AM PAC & JH-HLM score  - Consult OT/PT to assist with strengthening/mobility   - Keep Call bell within reach  - Keep bed low and locked with side rails adjusted as appropriate  - Keep care items and personal belongings within reach  - Initiate and maintain comfort rounds  - Make Fall Risk Sign visible to staff  - Apply yellow socks and bracelet for high fall risk patients  - Consider moving patient to room near nurses station  Outcome: Progressing  Goal: Maintain or return to baseline ADL function  Description: INTERVENTIONS:  -  Assess patient's ability to carry out ADLs; assess patient's baseline for ADL function and identify physical deficits which impact ability to perform ADLs (bathing, care of mouth/teeth, toileting, grooming, dressing, etc.)  - Assess/evaluate cause of self-care deficits   - Assess range of motion  - Assess patient's mobility; develop plan if impaired  - Assess patient's need for assistive devices and provide as appropriate  - Encourage maximum independence but intervene and supervise when necessary  - Involve family in performance of ADLs  - Assess for home care needs following discharge   - Consider OT consult to assist with ADL evaluation and planning for discharge  - Provide patient education as appropriate  - Monitor functional capacity and physical performance, use of AM PAC & JH-HLM   - Monitor gait, balance and fatigue with ambulation    Outcome: Progressing  Goal: Maintains/Returns to pre admission functional level  Description: INTERVENTIONS:  - Perform AM-PAC 6 Click Basic Mobility/ Daily Activity assessment daily.  - Set and communicate daily mobility goal to care team and patient/family/caregiver.   - Collaborate with rehabilitation services on mobility goals if consulted  - Out of bed for toileting  - Record patient progress and toleration of activity level   Outcome: Progressing     Problem: DISCHARGE  PLANNING  Goal: Discharge to home or other facility with appropriate resources  Description: INTERVENTIONS:  - Identify barriers to discharge w/patient and caregiver  - Arrange for needed discharge resources and transportation as appropriate  - Identify discharge learning needs (meds, wound care, etc.)  - Arrange for interpretive services to assist at discharge as needed  - Refer to Case Management Department for coordinating discharge planning if the patient needs post-hospital services based on physician/advanced practitioner order or complex needs related to functional status, cognitive ability, or social support system  Outcome: Progressing     Problem: Knowledge Deficit  Goal: Patient/family/caregiver demonstrates understanding of disease process, treatment plan, medications, and discharge instructions  Description: Complete learning assessment and assess knowledge base.  Interventions:  - Provide teaching at level of understanding  - Provide teaching via preferred learning methods  Outcome: Progressing

## 2025-06-30 NOTE — DISCHARGE SUMMARY
"Gyn Oncology Discharge Summary   Mae Coleman MRN: [id number]  Unit/Bed#: E5 -01 Encounter: [id number]      Admission Date: 6/27/2025     Discharge Date: 6/30/25    Attending: Tommy Seymour MD    Principal Diagnosis: Endometrial hyperplasia [N85.00]    Procedures:   RA-TLH, BSO, mini laparotomy    Hospital course: Patient underwent a RA-TLH, BSO, mini laparotomy for specimen extraction for indication of EIN. The surgery was uncomplicated. Postoperatively she developed dysphagia, difficulty breathing, and intolerance of PO intake. She was admitted for postoperative monitoring. ENT was consulted and left vocal cord paralysis was noted. Speech was consulted and cleared the patient for a regular diet on 6/29. Her symptoms were improving and she was discharged on a regular diet on 6/30 with 7 days of nimodipine. Post operatively she did well, had appropriate bowel and bladder function, was ambulating, and meeting all other post operative requirements.    Lab Results:   Lab Results   Component Value Date    WBC 5.80 06/30/2025    HGB 11.5 06/30/2025    HCT 36.6 06/30/2025    MCV 94 06/30/2025     06/30/2025     Lab Results   Component Value Date    CALCIUM 8.3 (L) 06/30/2025    K 3.7 06/30/2025    CO2 30 06/30/2025     06/30/2025    BUN 9 06/30/2025    CREATININE 0.62 06/30/2025     No results found for: \"POCGLU\"  No results found for: \"PTT\"  No results found for: \"INR\", \"PROTIME\"    Complications: none apparent    Condition at discharge: stable     Discharge instructions/Information to patient and family:   See After Visit Summary for information provided to patient and family.      Provisions for Follow-Up Care:  See After Visit Summary for information related to follow-up care and any pertinent home health orders.      Disposition: See After Visit Summary for discharge disposition information.    Planned Readmission: No    Discharge Medications:  For a complete list of the patient's medications, " please refer to her med rec.    Tiffany Zacarias MD  6/30/2025  5:49 AM

## 2025-07-01 ENCOUNTER — TELEPHONE (OUTPATIENT)
Age: 55
End: 2025-07-01

## 2025-07-01 NOTE — TELEPHONE ENCOUNTER
Nessa is calling with a question why her mom is taking nimotop 30 mg tablets.  She stated that she was not given this medication while she was in patient.  Nessa can be reached at [phone].

## 2025-07-01 NOTE — TELEPHONE ENCOUNTER
Return call placed to patient's daughter. They are not comfortable taking the nifedipine because it is a BP medication and is prescribed 4x daily with nothing to eat 1 hour pre or 2 hours post administration, which leaves little time for eating. Additionally, her symptoms of vocal cord paralysis are improving. Will d/c from her med list.    Patient is feeling well and will f/u in the office on 7/11 for first postop appt.

## 2025-07-07 ENCOUNTER — NURSE TRIAGE (OUTPATIENT)
Age: 55
End: 2025-07-07

## 2025-07-07 NOTE — TELEPHONE ENCOUNTER
REASON FOR CONVERSATION: Pressure during bowel movements and Constipation    SYMPTOMS:   -Constipation since coming home from hospital. Reports last normal bowel movement was Tuesday 7/1. Since then there have been some days with no bowel movement and others she just passes very thin stools. She has intermittent abdominal cramping and some rectal pressure like she has to have a bowel movement but can't go very much.  -Taking miralax and colace daily, yesterday took colace twice but is back to once daily today. She didn't take miralax today because she didn't think she was drinking enough water for it but will try to increase fluid intake.    OTHER HEALTH INFORMATION:   -s/p hysterectomy 6/27 Dr. Seymour  -taking tylenol or ibuprofen for surgical pain as needed    PROTOCOL DISPOSITION: Callback From Office Today (overriding Home Care)    CARE ADVICE PROVIDED:   -Encouraged patient to increase fluid intake, fiber in diet, and continue with walking. States she is walking a lot since she's been home from surgery.    PRACTICE FOLLOW-UP:   -Requests call back with recommendations on adjustments to stool softeners or laxatives. She had inquired about enema or glycerin suppository. I advised not to try anything until she hears back with Dr. Seymour's recommendations. She has glycerin suppositories on hand if he recommends that.       Reason for Disposition   Recent change in stools or bowel habits    Answer Assessment - Initial Assessment Questions  1.What is the cancer diagnosis, and what treatment is the patient receiving? Review past medical history (e.g., diagnoses, surgeries, treatments).  Hysterectomy 6/27    2.What medications is the patient taking? Obtain medication history, including over-the-counter medications and complementary therapies.  Miralax and colace daily, yesterday took colace twice. Today did not take her miralax because she felt she wasn't drinking enough water for it to work but will increase her fluid  intake    3.Ask the patient to describe a typical bowel movement, including frequency, amount and characteristics of their stool, and timing of bowel movements?  Normally would have a bowel movement daily. Last normal bowel movement was Tuesday. Since then there have been several days with no bowel movement at all and other days she just passes very thin stools      5. Was there a distinct odor change or any blood present in the stool?   denies    6. Have you had diarrhea?   denies    7. Was the stool difficult to pass?   yes    8. Were there any associated symptoms (e.g., abdominal, or rectal fullness, bloating, nausea, vomiting, excessive gas, pain, cramping)? Has there been any perineal or rectal discomfort?   Some abdominal cramping, rectal pressure. Denies nausea/vomiting.    11. Assess changes in activities of daily living and function, including dietary history, decrease in food or fluid consumption, or decrease in dietary fiber.   Walking, states she's not drinking as much water as she should. Will try to increase oral hydration.    Protocols used: ONC- Constipation- ADULT-OH

## 2025-07-07 NOTE — TELEPHONE ENCOUNTER
Return call placed. Will increase colace to TID and miralax to start BID. Patient to call the office with no improvement in 48 hrs.

## 2025-07-07 NOTE — TELEPHONE ENCOUNTER
Patient had a hysterectomy, and 4 the past 4 days she has been feeling a lot of pressure when she tries to have a bowel movement.      Please call to discuss

## 2025-07-09 ENCOUNTER — DOCUMENTATION (OUTPATIENT)
Dept: HEMATOLOGY ONCOLOGY | Facility: CLINIC | Age: 55
End: 2025-07-09

## 2025-07-09 PROCEDURE — 88341 IMHCHEM/IMCYTCHM EA ADD ANTB: CPT | Performed by: PATHOLOGY

## 2025-07-09 PROCEDURE — 88309 TISSUE EXAM BY PATHOLOGIST: CPT | Performed by: PATHOLOGY

## 2025-07-09 PROCEDURE — 88342 IMHCHEM/IMCYTCHM 1ST ANTB: CPT | Performed by: PATHOLOGY

## 2025-07-09 NOTE — PROGRESS NOTES
In-basket message received from Dr. Valero to add patient to the [facility] on 7/21/2025. Chart reviewed and prep completed.

## 2025-07-11 ENCOUNTER — OFFICE VISIT (OUTPATIENT)
Dept: GYNECOLOGIC ONCOLOGY | Facility: CLINIC | Age: 55
End: 2025-07-11

## 2025-07-11 VITALS
WEIGHT: 279 LBS | HEART RATE: 69 BPM | OXYGEN SATURATION: 98 % | RESPIRATION RATE: 16 BRPM | HEIGHT: 61 IN | DIASTOLIC BLOOD PRESSURE: 86 MMHG | BODY MASS INDEX: 52.67 KG/M2 | TEMPERATURE: 97.7 F | SYSTOLIC BLOOD PRESSURE: 130 MMHG

## 2025-07-11 DIAGNOSIS — C54.1 ENDOMETRIAL CANCER (HCC): ICD-10-CM

## 2025-07-11 DIAGNOSIS — Z09 POSTOP CHECK: Primary | ICD-10-CM

## 2025-07-11 DIAGNOSIS — J38.01 PARESIS OF LEFT VOCAL CORD: ICD-10-CM

## 2025-07-11 PROBLEM — N85.00 ENDOMETRIAL HYPERPLASIA: Status: RESOLVED | Noted: 2025-05-19 | Resolved: 2025-07-11

## 2025-07-11 PROCEDURE — 99024 POSTOP FOLLOW-UP VISIT: CPT | Performed by: NURSE PRACTITIONER

## 2025-07-11 NOTE — PATIENT INSTRUCTIONS
1. Return to the office in 1 month for vaginal cuff check.  2. Contact the office in the interim if you develop any any new or concerning symptoms, including vaginal bleeding, discharge, pain, etc.

## 2025-07-11 NOTE — ASSESSMENT & PLAN NOTE
54-year-old who is s/p robot hysterectomy BSO and mini laparotomy for specimen extraction on 6/27/25. 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 MMR intact. She is recovering uneventfully and has no concerns. She is moving her bowels and having no bleeding or significant pain. Her abdominal incision sites are healing well.    Reviewed with patient and her family the pathologic diagnosis of stage IA endometrial cancer. Reviewed that this was confined to the uterus, and no further treatment is needed.    Patient will RTO in 4 weeks for vaginal cuff exam. We will then initiate surveillance. She understands she will be seen every 6 months for a period of two years.    Reviewed postoperative restrictions/pelvic rest. She will call in the interim with any new concerns.

## 2025-07-11 NOTE — PROGRESS NOTES
Name: Mae Coleman      : 1970      MRN: [id number]  Encounter Provider: ENRIQUE Whelan  Encounter Date: 2025   Encounter department: [facility] [place]  :  Assessment & Plan  Postop check  54-year-old who is s/p robot hysterectomy BSO and mini laparotomy for specimen extraction on 25. 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 MMR intact. She is recovering uneventfully and has no concerns. She is moving her bowels and having no bleeding or significant pain. Her abdominal incision sites are healing well.    Reviewed with patient and her family the pathologic diagnosis of stage IA endometrial cancer. Reviewed that this was confined to the uterus, and no further treatment is needed.    Patient will RTO in 4 weeks for vaginal cuff exam. We will then initiate surveillance. She understands she will be seen every 6 months for a period of two years.    Reviewed postoperative restrictions/pelvic rest. She will call in the interim with any new concerns.         Endometrial cancer (HCC)         Paresis of left vocal cord  Improving without nifedipine.                 History of Present Illness   Reason for Visit / CC: Postop check     Mae Coleman is a 54 y.o. female     This is a 54 y.o. female who presents to the office for post-operative evaluation. She is recovering uneventfully from surgery and is without acute complaints. She has been afebrile. She denies nausea or vomiting. Her appetite is appropriate. She reports normal bowel and bladder function. She uses Miralax and Colace to prevent constipation. She denies vaginal bleeding or discharge. She is without abdominal or pelvic pain. She is ambulatory.        Pertinent Medical History        Oncology History   Cancer Staging   Endometrial cancer (HCC)  Staging form: Corpus Uteri - Carcinoma, AJCC 8th Edition  - Clinical stage from 2025: FIGO Stage IA (cT1a, cN0, cM0) -  Signed by ENRIQUE Whelan on 7/11/2025  Stage prefix: Initial diagnosis  Histologic grade (G): G1  Histologic grading system: 3 grade system  Oncology History   Endometrial cancer (HCC)   6/27/2025 -  Cancer Staged    Staging form: Corpus Uteri - Carcinoma, AJCC 8th Edition  - Clinical stage from 6/27/2025: FIGO Stage IA (cT1a, cN0, cM0) - Signed by ENRIQUE Whelan on 7/11/2025  Stage prefix: Initial diagnosis  Histologic grade (G): G1  Histologic grading system: 3 grade system       7/11/2025 Initial Diagnosis    Endometrial cancer (HCC)        Review of Systems   Constitutional:  Negative for chills, fatigue, fever and unexpected weight change.   HENT:  Negative for nosebleeds.    Eyes: Negative.    Respiratory:  Negative for cough, chest tightness, shortness of breath and wheezing.    Cardiovascular:  Negative for chest pain, palpitations and leg swelling.   Gastrointestinal:  Negative for abdominal distention, abdominal pain, anal bleeding, blood in stool, constipation, diarrhea, nausea, rectal pain and vomiting.   Endocrine: Negative.    Genitourinary:  Negative for difficulty urinating, dysuria, frequency, hematuria, pelvic pain, urgency, vaginal bleeding, vaginal discharge and vaginal pain.   Musculoskeletal:  Negative for arthralgias and joint swelling.   Skin:  Negative for color change, pallor and rash.   Neurological:  Negative for dizziness, weakness, light-headedness, numbness and headaches.   Hematological: Negative.    Psychiatric/Behavioral: Negative.      A complete review of systems is negative other than that noted above in the HPI.  Medical History Reviewed by provider this encounter:  Problems     .  Past Medical History   Past Medical History[1]  Past Surgical History[2]  Family History[3]   reports that she has quit smoking. Her smoking use included cigarettes. She has never used smokeless tobacco. She reports that she does not drink alcohol and does not use drugs.  Current  "Outpatient Medications   Medication Instructions    acetaminophen (TYLENOL) 1,000 mg, Oral, Every 6 hours PRN    benzonatate (TESSALON PERLES) 100 mg, Oral, 3 times daily PRN    ibuprofen (MOTRIN) 600 mg, Oral, Every 6 hours PRN    pantoprazole (PROTONIX) 40 mg, Oral, Daily (early morning)    polyethylene glycol (MIRALAX) 17 g, Oral, Daily   Allergies[4]   Medications Ordered Prior to Encounter[5]   Social History[6]     Objective   /86 (BP Location: Left arm, Patient Position: Sitting, Cuff Size: Large)   Pulse 69   Temp 97.7 °F (36.5 °C) (Temporal)   Resp 16   Ht 5' 1\" (1.549 m)   Wt 127 kg (279 lb)   LMP  (LMP Unknown)   SpO2 98%   BMI 52.72 kg/m²     Body mass index is 52.72 kg/m².  Pain Screening:  Pain Score: 0-No pain  ECOG   0    Physical Exam  Vitals reviewed.   Constitutional:       General: She is not in acute distress.     Appearance: Normal appearance. She is obese.   HENT:      Head: Normocephalic and atraumatic.      Mouth/Throat:      Mouth: Mucous membranes are moist.   Pulmonary:      Effort: Pulmonary effort is normal.      Breath sounds: Normal breath sounds.   Abdominal:      Palpations: Abdomen is soft. There is no mass.      Tenderness: There is no abdominal tenderness.     Skin:     General: Skin is warm and dry.      Comments: Surgical trocar sites and mini-lap site are intact, clean and dry without induration, erythema or purulent drainage.       Neurological:      Mental Status: She is alert and oriented to person, place, and time.     Psychiatric:         Mood and Affect: Mood normal.         Behavior: Behavior normal.         Thought Content: Thought content normal.         Judgment: Judgment normal.          Labs: I have reviewed pertinent labs.   No results found for: \"\"  Lab Results   Component Value Date/Time    Potassium 3.7 06/30/2025 04:38 AM    Chloride 106 06/30/2025 04:38 AM    CO2 30 06/30/2025 04:38 AM    BUN 9 06/30/2025 04:38 AM    Creatinine 0.62 " "06/30/2025 04:38 AM    Glucose, Fasting 96 06/11/2025 09:03 AM    Calcium 8.3 (L) 06/30/2025 04:38 AM    AST 24 06/11/2025 09:03 AM    ALT 46 06/11/2025 09:03 AM    Alkaline Phosphatase 57 06/11/2025 09:03 AM    eGFR 101 06/30/2025 04:38 AM     Lab Results   Component Value Date/Time    WBC 5.80 06/30/2025 04:38 AM    Hemoglobin 11.5 06/30/2025 04:38 AM    Hematocrit 36.6 06/30/2025 04:38 AM    MCV 94 06/30/2025 04:38 AM    Platelets 214 06/30/2025 04:38 AM     Lab Results   Component Value Date/Time    Absolute Neutrophils 3.28 06/11/2025 09:03 AM        Trend:  No results found for: \"\"      Other Study Results Review : Pathology reports reviewed.    Pathology:  Case Report   Surgical Pathology Report                         Case: [id number]                                   Authorizing Provider:  Tommy Seymour MD       Collected:           06/27/2025 0837               Ordering Location:     [facility]        Received:            06/27/2025 [address] [place] [address] Operating Room                                                     Pathologist:           Beka Fonseca MD                                                         Specimen:    Uterus w/Bilateral Ovaries and Fallopian Tubes, uterus cervix, bilateral fallopian                  tubes and bilateral ovaries                                                                Addendum   RESULTS OF IMMUNOHISTOCHEMICAL ANALYSIS FOR MISMATCH REPAIR PROTEIN LOSS  INTERPRETATION: No loss of nuclear expression of MMR proteins: low probability of MSI-H        RESULTS:  Antibody            Clone                 Description                                  Results  MLH1                 G168                  Mismatch repair protein               Intact nuclear expression  MSH2                [id number]        Mismatch repair protein               Intact nuclear expression  MSH6                SP93                  Mismatch " repair protein               Intact nuclear expression  PMS2                 EP51                  Mismatch repair protein               Intact nuclear expression     Note: A positive control for each antibody have been reviewed and accepted.       These tests were developed, and their performance characteristics determined by [facility].  They may not be cleared or approved by the U.S. Food and Drug Administration.  The FDA determined that such clearance or approval is not necessary.  These tests are used for clinical purposes.  They should not be regarded as investigational or for research.  This laboratory has been approved by [place] [id number], designated as a high-complexity laboratory and is qualified to perform these tests.   Addendum electronically signed by Cira Mcgowan MD on 7/10/2025 at 0741 EDT   Final Diagnosis   A.  Uterus, cervix and bilateral ovaries and fallopian tubes:     - Cervix:       -- Ectocervix without significant pathologic abnormalities; negative for malignancy.       -- Tumor focally involves lower uterine segment (vimentin immunostain).     - Uterus:       -- Endometrioid adenocarcinoma, FIGO grade 1, confined to the endometrium.       -- Adenomyomatous endometrial polyp partially involved by adenocarcinoma.       -- Adenomyosis partially involved by adenocarcinoma.       -- No lymphovascular invasion identified (CD31 and podoplanin immunostains).       -- Background of atypical endometrial hyperplasia / endometrial intraepithelial neoplasia (EIN).       -- Intramural leiomyoma.       -- Adenomatoid tumor.     - Ovaries, bilateral:       -- Surface epithelial inclusion glands and cysts.       -- No malignancy identified.     - Fallopian tubes, bilateral:       -- No significant pathologic abnormalities; negative for malignancy.     - Parametrial tissues, bilateral:       -- No significant pathologic abnormalities; negative for malignancy.       Electronically signed by Beka Fonseca MD on 7/9/2025 at 1202 EDT   Note    The endomyometrial junction is in part irregular and the possibility of superficial invasion was considered, however the presence of rare benign glands at the base of the carcinoma favors confinement of the tumor to the endometrium.     The tumor cells are positive for ER and OK and >90% and show wild-type staining for p53.  MMR is pending.     Positive immunostaining for calretinin and pankeratin (AE1/AE3) helps support the finding of an adenomatoid tumor.  WT-1 was noncontributory.       The polypoid endometrial lesion consists of groups of endometrial glands surrounded by CD10 positive endometrial stroma which in turn is surrounded SMA positive smooth muscle not strictly associated with vessels favoring an adenomyomatous polyp.     CD10 was used to help confirm tumor involved adenomyosis.       Groups of epithelioid like cells were noted in the right fallopian tube lumen.  They are positive for CD68 and negative for PAX8 which is consistent with histiocytes.              [1]   Past Medical History:  Diagnosis Date    Adult BMI 50.0-59.9 kg/sq m (HCC)     Obesity     Polycystic ovary syndrome    [2]   Past Surgical History:  Procedure Laterality Date    BREAST BIOPSY Left 08/29/2019    benign    BREAST CYST ASPIRATION Left 08/29/2019    benign    CHOLECYSTECTOMY      DE QUERVAIN'S RELEASE      x 2    GALLBLADDER SURGERY      OK LAPS TOTAL HYSTERECT 250 GM/< W/RMVL TUBE/OVARY N/A 6/27/2025    Procedure: HYSTERECTOMY LAPAROSCOPIC TOTAL (LTH) BSO W/ROBOTICS WITH MINI LAPAROTOMY, EXAM UNDER ANESTHESIA;  Surgeon: Tommy Seymour MD;  Location: [facility];  Service: Gynecology Oncology    US BREAST CYST ASPIRATION LEFT INITIAL Left 08/29/2019   [3]   Family History  Problem Relation Name Age of Onset    Hypertension Mother      Stroke Mother      Diabetes Mother      Kidney failure Mother      Diabetes Father      No Known Problems  Sister evi     Hypertension Sister namita     Hypothyroidism Sister namita     Cancer Sister Michelle- 1/2 sister         anorectal cancer    Breast cancer Sister Michelle- 1/2 sister         in 40s    No Known Problems Daughter      No Known Problems Maternal Grandmother      No Known Problems Maternal Grandfather      No Known Problems Paternal Grandmother      No Known Problems Paternal Grandfather      No Known Problems Maternal Aunt      No Known Problems Maternal Aunt      No Known Problems Paternal Aunt      No Known Problems Paternal Aunt      Breast cancer Half-Sister michelle 49    Colon cancer Half-Sister michelle 57    Breast cancer additional onset Half-Sister michelle         total 4xs breast cancer    Endometrial cancer Half-Sister ky 54    Pancreatic cancer Half-Sister ky     Kidney failure Half-Sister dina     Substance Abuse Half-Brother      No Known Problems Half-Brother      Ovarian cancer Neg Hx      Uterine cancer Neg Hx     [4] No Known Allergies  [5]   Current Outpatient Medications on File Prior to Visit   Medication Sig Dispense Refill    acetaminophen (TYLENOL) 500 mg tablet Take 2 tablets (1,000 mg total) by mouth every 6 (six) hours as needed for mild pain or moderate pain 60 tablet 0    ibuprofen (MOTRIN) 600 mg tablet Take 1 tablet (600 mg total) by mouth every 6 (six) hours as needed for mild pain or moderate pain 30 tablet 0    pantoprazole (PROTONIX) 40 mg tablet Take 1 tablet (40 mg total) by mouth daily in the early morning 14 tablet 0    polyethylene glycol (MIRALAX) 17 g packet Take 17 g by mouth daily 10 each 0    benzonatate (TESSALON PERLES) 100 mg capsule Take 1 capsule (100 mg total) by mouth 3 (three) times a day as needed for cough 20 capsule 0     No current facility-administered medications on file prior to visit.   [6]   Social History  Tobacco Use    Smoking status: Former     Types: Cigarettes    Smokeless tobacco: Never   Vaping Use    Vaping status: Never Used    Substance and Sexual Activity    Alcohol use: Never    Drug use: Never    Sexual activity: Yes     Partners: Male     Birth control/protection: None     Comment: current partner, 10 years

## 2025-07-14 ENCOUNTER — HOSPITAL ENCOUNTER (OUTPATIENT)
Dept: MAMMOGRAPHY | Facility: MEDICAL CENTER | Age: 55
Discharge: HOME | End: 2025-07-14
Payer: COMMERCIAL

## 2025-07-14 DIAGNOSIS — Z12.31 ENCOUNTER FOR SCREENING MAMMOGRAM FOR BREAST CANCER: ICD-10-CM

## 2025-07-14 PROCEDURE — 77063 BREAST TOMOSYNTHESIS BI: CPT

## 2025-07-14 PROCEDURE — 77067 SCR MAMMO BI INCL CAD: CPT

## 2025-07-15 ENCOUNTER — TELEPHONE (OUTPATIENT)
Age: 55
End: 2025-07-15

## 2025-07-17 ENCOUNTER — OFFICE VISIT (OUTPATIENT)
Dept: GYNECOLOGIC ONCOLOGY | Facility: CLINIC | Age: 55
End: 2025-07-17
Payer: COMMERCIAL

## 2025-07-17 ENCOUNTER — NURSE TRIAGE (OUTPATIENT)
Age: 55
End: 2025-07-17

## 2025-07-17 VITALS
RESPIRATION RATE: 18 BRPM | HEART RATE: 71 BPM | SYSTOLIC BLOOD PRESSURE: 124 MMHG | OXYGEN SATURATION: 95 % | HEIGHT: 61 IN | WEIGHT: 276 LBS | BODY MASS INDEX: 52.11 KG/M2 | TEMPERATURE: 97.6 F | DIASTOLIC BLOOD PRESSURE: 70 MMHG

## 2025-07-17 DIAGNOSIS — C54.1 ENDOMETRIAL CANCER (HCC): Primary | ICD-10-CM

## 2025-07-17 PROCEDURE — 99213 OFFICE O/P EST LOW 20 MIN: CPT | Performed by: OBSTETRICS & GYNECOLOGY

## 2025-07-17 NOTE — PROGRESS NOTES
Name: Mae Coleman      : 1970      MRN: [id number]  Encounter Provider: Tommy Seymour MD  Encounter Date: 2025   Encounter department: [facility] [place]  :  Assessment & Plan  Endometrial cancer (HCC)  Patient has a new diagnosis of stage Ia grade 1 noninvasive endometrial adenocarcinoma.  We have quoted approximately 99% cure rate and have recommended no adjuvant therapy.  We have recommended patient follow-up in every 6 months for 2 years then annually.    With regard to vocal cord injury/paralysis at surgery this is significantly better.  The patient is functioning normally now.    With regard to the wound.  There is no evidence of infection however there is necrosis from cautery.  We have recommended bacitracin on the wound twice a day with twice daily wound cleansing.  Patient will follow-up in 2 weeks for repeat wound check.               History of Present Illness   Reason for Visit / CC: Treatment planning newly diagnosed endometrial cancer   Mae Coleman is a 55 y.o. female   Patient is a very pleasant 55-year-old female who recently underwent robot-assisted total laparoscopic hysterectomy bilateral salpingo-oophorectomy and mini laparotomy for specimen extraction.  Surgery was complicated by vocal cord paralysis with difficulty speaking and swallowing.  Patient significantly improved after being seen by ENT 2 days after surgery.  Patient presents today for evaluation of wound.    Patient final pathology report reveals the following:  Final Diagnosis  A.  Uterus, cervix and bilateral ovaries and fallopian tubes:     - Cervix:       -- Ectocervix without significant pathologic abnormalities; negative for malignancy.       -- Tumor focally involves lower uterine segment (vimentin immunostain).     - Uterus:       -- Endometrioid adenocarcinoma, FIGO grade 1, confined to the endometrium.       -- Adenomyomatous endometrial polyp partially involved by  adenocarcinoma.       -- Adenomyosis partially involved by adenocarcinoma.       -- No lymphovascular invasion identified (CD31 and podoplanin immunostains).       -- Background of atypical endometrial hyperplasia / endometrial intraepithelial neoplasia (EIN).       -- Intramural leiomyoma.       -- Adenomatoid tumor.     - Ovaries, bilateral:       -- Surface epithelial inclusion glands and cysts.       -- No malignancy identified.     - Fallopian tubes, bilateral:       -- No significant pathologic abnormalities; negative for malignancy.     - Parametrial tissues, bilateral:       -- No significant pathologic abnormalities; negative for malignancy.    Today, the patient is doing well.  She denies significant abdominal pain, pelvic pain, nausea, vomiting, constipation, diarrhea, fevers, chills, or vaginal bleeding.  Patient reports that her swallowing has improved significantly.  She is speaking without difficulty but does note some fatigue when she is speaking for some time.  She notes that her left lateralmost port site has some purulent matter.  She is otherwise doing well.         Pertinent Medical History        Oncology History   Cancer Staging   Endometrial cancer (HCC)  Staging form: Corpus Uteri - Carcinoma, AJCC 8th Edition  - Clinical stage from 6/27/2025: FIGO Stage IA (cT1a, cN0, cM0) - Signed by ENRIQUE Whelan on 7/11/2025  Stage prefix: Initial diagnosis  Histologic grade (G): G1  Histologic grading system: 3 grade system  Oncology History   Endometrial cancer (HCC)   6/27/2025 -  Cancer Staged    Staging form: Corpus Uteri - Carcinoma, AJCC 8th Edition  - Clinical stage from 6/27/2025: FIGO Stage IA (cT1a, cN0, cM0) - Signed by ENRIQUE Whelan on 7/11/2025  Stage prefix: Initial diagnosis  Histologic grade (G): G1  Histologic grading system: 3 grade system       7/11/2025 Initial Diagnosis    Endometrial cancer (HCC)     7/2025 Surgery    Patient underwent robot-assisted total  "laparoscopic hysterectomy bilateral salpingo-oophorectomy with mini laparotomy for specimen extraction.  Surgery was complicated by vocal cord paralysis which has improved spontaneously.  Final pathology report reveals stage Ia grade 1 noninvasive endometrial adenocarcinoma no further therapy recommended.        Review of Systems   Constitutional: Negative.    HENT: Negative.     Eyes: Negative.    Respiratory: Negative.     Cardiovascular: Negative.    Gastrointestinal: Negative.    Endocrine: Negative.    Genitourinary: Negative.    Musculoskeletal: Negative.    Skin: Negative.    Neurological: Negative.    Hematological: Negative.    Psychiatric/Behavioral: Negative.      A complete review of systems is negative other than that noted above in the HPI.       Objective   /70 (BP Location: Right arm, Patient Position: Sitting, Cuff Size: Large)   Pulse 71   Temp 97.6 °F (36.4 °C) (Temporal)   Resp 18   Ht 5' 1\" (1.549 m)   Wt 125 kg (276 lb)   LMP  (LMP Unknown)   SpO2 95%   BMI 52.15 kg/m²     Body mass index is 52.15 kg/m².  Pain Screening:  Pain Score: 0-No pain  ECOG   0  Physical Exam  Constitutional:       Appearance: She is well-developed.   HENT:      Head: Normocephalic and atraumatic.   Neck:      Thyroid: No thyromegaly.     Cardiovascular:      Rate and Rhythm: Normal rate and regular rhythm.      Heart sounds: Normal heart sounds.   Pulmonary:      Effort: Pulmonary effort is normal.      Breath sounds: Normal breath sounds.   Abdominal:      General: Bowel sounds are normal.      Palpations: Abdomen is soft.      Comments: Well healed laparoscopic incisions.  With the exception of the left lateral port site.  This is somewhat open.  Suture is minimally visible.  Necrotic cauterized adipose tissue is noted deep to this.  There is no evidence of active infection.   Genitourinary:     Comments: -Normal external female genitalia, normal Bartholin's and Correll's glands                  -Normal " "midline urethral meatus. No lesions notes                  -Bladder without fullness mass or tenderness                  -Vagina without lesion or discharge No significant cystocele or rectocele noted                  -Cervix surgically absent                  -Uterus surgically absent                  -Adnexae surgically absent                  - Anus without fissure of lesion      Musculoskeletal:         General: Normal range of motion.      Cervical back: Normal range of motion and neck supple.   Lymphadenopathy:      Cervical: No cervical adenopathy.     Skin:     General: Skin is warm and dry.     Neurological:      Mental Status: She is alert and oriented to person, place, and time.     Psychiatric:         Behavior: Behavior normal.          Labs: I have reviewed pertinent labs.   No results found for: \"\"  Lab Results   Component Value Date/Time    Potassium 3.7 06/30/2025 04:38 AM    Chloride 106 06/30/2025 04:38 AM    CO2 30 06/30/2025 04:38 AM    BUN 9 06/30/2025 04:38 AM    Creatinine 0.62 06/30/2025 04:38 AM    Glucose, Fasting 96 06/11/2025 09:03 AM    Calcium 8.3 (L) 06/30/2025 04:38 AM    AST 24 06/11/2025 09:03 AM    ALT 46 06/11/2025 09:03 AM    Alkaline Phosphatase 57 06/11/2025 09:03 AM    eGFR 101 06/30/2025 04:38 AM     Lab Results   Component Value Date/Time    WBC 5.80 06/30/2025 04:38 AM    Hemoglobin 11.5 06/30/2025 04:38 AM    Hematocrit 36.6 06/30/2025 04:38 AM    MCV 94 06/30/2025 04:38 AM    Platelets 214 06/30/2025 04:38 AM     Lab Results   Component Value Date/Time    Absolute Neutrophils 3.28 06/11/2025 09:03 AM        Trend:  No results found for: \"\"            "

## 2025-07-17 NOTE — TELEPHONE ENCOUNTER
REASON FOR CONVERSATION: Post-op Problem    SYMPTOMS: Has noticed pus in her incision    OTHER HEALTH INFORMATION: Hysterectomy one month ago.     PROTOCOL DISPOSITION: See PCP Within 24 Hours    CARE ADVICE PROVIDED: Monitor for increased drainage.     PRACTICE FOLLOW-UP: Pt is asking for a call back with Dr. Seymour's instructions.

## 2025-07-17 NOTE — ASSESSMENT & PLAN NOTE
Patient has a new diagnosis of stage Ia grade 1 noninvasive endometrial adenocarcinoma.  We have quoted approximately 99% cure rate and have recommended no adjuvant therapy.  We have recommended patient follow-up in every 6 months for 2 years then annually.    With regard to vocal cord injury/paralysis at surgery this is significantly better.  The patient is functioning normally now.    With regard to the wound.  There is no evidence of infection however there is necrosis from cautery.  We have recommended bacitracin on the wound twice a day with twice daily wound cleansing.  Patient will follow-up in 2 weeks for repeat wound check.

## 2025-07-17 NOTE — TELEPHONE ENCOUNTER
Please offer her an appt in [place] with Dr. Seymour this afternoon or with Vivi in [place] tomorrow. Thanks!

## 2025-07-17 NOTE — TELEPHONE ENCOUNTER
"Reason for Disposition   [1] Clear or blood-tinged fluid draining from wound AND [2] no fever    Answer Assessment - Initial Assessment Questions  1. SYMPTOM: \"What's the main symptom you're concerned about?\" (e.g., redness, pain, drainage)      Noticed pus from incisional site.   2. ONSET: \"When did pus  start?\"      This morning  3. SURGERY: \"What surgery was performed?\"      Hysterectomy  4. DATE of SURGERY: \"When was surgery performed?\"       6/27/25  5. INCISION SITE: \"Where is the incision located?\"       Lower abdomen  6. REDNESS: \"Is there any redness at the incision site?\" If yes, ask: \"How wide across is the redness?\" (Inches, centimeters)       Yes and swelling  7. PAIN: \"Is there any pain?\" If so, ask: \"How bad is it?\"  (Scale 1-10; or mild, moderate, severe)      Slight discomfort.   8. BLEEDING: \"Is there any bleeding?\" If so, ask: \"How much?\" and \"Where?\"      no  9. DRAINAGE: \"Is there any drainage from the incision site?\" If yes, ask: \"What color and how much?\" (e.g., red, cloudy, pus; drops, teaspoon)      Yellow, opaque white.  Can see it in the wound.   10. FEVER: \"Do you have a fever?\" If so, ask: \"What is your temperature, how was it measured, and when did it start?\"        no  11. OTHER SYMPTOMS: \"Do you have any other symptoms?\" (e.g., shaking chills, weakness, rash elsewhere on body)        no    Protocols used: Post-Op Incision Symptoms-ADULT-AH    "

## 2025-07-17 NOTE — TELEPHONE ENCOUNTER
Spoke to the patient and offered her a 3 PM appointment with  to make sure her incision is doing well. Patient stated the appointment works well for her and she will get ready to be seen today.

## 2025-07-22 ENCOUNTER — DOCUMENTATION (OUTPATIENT)
Dept: GYNECOLOGIC ONCOLOGY | Facility: CLINIC | Age: 55
End: 2025-07-22

## 2025-07-22 NOTE — PROGRESS NOTES
Multidisciplinary Gynecologic Oncology Tumor Case Review       Physician Recommended Plan     Mae Coleman is a 55 y.o. 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 The group recommended treatment with routine observation.  Refer to Genetics.     Follow-up appointment with Vivi NUNEZ on 7/31/2025.     NCCN guidelines were available for review.     The final treatment plan will be left to the discretion of the patient and the treating physician.       DISCLAIMERS:    TO THE TREATING PHYSICIAN:  This conference is a meeting of clinicians from various specialty areas who evaluate and discuss patients for whom a multidisciplinary treatment approach is being considered. Please note that the above opinion was a consensus of the conference attendees and is intended only to assist in quality care of the discussed patient.  The responsibility for follow up on the input given during the conference, along with any final decisions regarding plan of care, is that of the patient and the patient's provider. Accordingly, appointments have only been recommended based on this information and have NOT been scheduled unless otherwise noted.      TO THE PATIENT:  This summary is a brief record of major aspects of your cancer treatment. You may choose to share a copy with any of your doctors or nurses. However, this is not a detailed or comprehensive record of your care.

## 2025-07-31 ENCOUNTER — OFFICE VISIT (OUTPATIENT)
Dept: GYNECOLOGIC ONCOLOGY | Facility: CLINIC | Age: 55
End: 2025-07-31

## 2025-07-31 VITALS
BODY MASS INDEX: 51.77 KG/M2 | WEIGHT: 274 LBS | SYSTOLIC BLOOD PRESSURE: 140 MMHG | TEMPERATURE: 97.7 F | DIASTOLIC BLOOD PRESSURE: 80 MMHG | HEART RATE: 78 BPM | OXYGEN SATURATION: 98 %

## 2025-07-31 DIAGNOSIS — Z09 POSTOP CHECK: Primary | ICD-10-CM

## 2025-07-31 DIAGNOSIS — C54.1 ENDOMETRIAL CANCER (HCC): ICD-10-CM

## 2025-07-31 PROCEDURE — 99024 POSTOP FOLLOW-UP VISIT: CPT | Performed by: NURSE PRACTITIONER

## (undated) DEVICE — Device

## (undated) DEVICE — SLEEVE SCD KNEE MEDIUM

## (undated) DEVICE — SUT STRATAFIX SPIRAL 2-0 CT-1 30 CM SXPP1B410

## (undated) DEVICE — PACK PBDS ROBOTIC BARIATRIC